# Patient Record
Sex: MALE | Race: BLACK OR AFRICAN AMERICAN | NOT HISPANIC OR LATINO
[De-identification: names, ages, dates, MRNs, and addresses within clinical notes are randomized per-mention and may not be internally consistent; named-entity substitution may affect disease eponyms.]

---

## 2017-01-17 ENCOUNTER — CHARTING TRANS (OUTPATIENT)
Dept: OTHER | Age: 22
End: 2017-01-17

## 2017-05-25 ENCOUNTER — CHARTING TRANS (OUTPATIENT)
Dept: OTHER | Age: 22
End: 2017-05-25

## 2017-05-25 ENCOUNTER — IMAGING SERVICES (OUTPATIENT)
Dept: OTHER | Age: 22
End: 2017-05-25

## 2018-04-10 ENCOUNTER — CHARTING TRANS (OUTPATIENT)
Dept: OTHER | Age: 23
End: 2018-04-10

## 2018-04-19 ENCOUNTER — CHARTING TRANS (OUTPATIENT)
Dept: OTHER | Age: 23
End: 2018-04-19

## 2018-04-19 ASSESSMENT — PAIN SCALES - GENERAL: PAINLEVEL_OUTOF10: 0

## 2018-04-21 ENCOUNTER — CHARTING TRANS (OUTPATIENT)
Dept: OTHER | Age: 23
End: 2018-04-21

## 2018-04-26 ENCOUNTER — LAB SERVICES (OUTPATIENT)
Dept: OTHER | Age: 23
End: 2018-04-26

## 2018-05-01 ENCOUNTER — CHARTING TRANS (OUTPATIENT)
Dept: OTHER | Age: 23
End: 2018-05-01

## 2018-05-01 LAB
ALBUMIN SERPL-MCNC: 4.1 G/DL (ref 3.6–5.1)
ALBUMIN/GLOB SERPL: 1.2 (ref 1–2.4)
ALP SERPL-CCNC: 74 UNITS/L (ref 45–117)
ALT SERPL-CCNC: 31 UNITS/L
ANION GAP SERPL CALC-SCNC: 15 MMOL/L (ref 10–20)
AST SERPL-CCNC: 23 UNITS/L
BASOPHILS # BLD: 0.1 K/MCL (ref 0–0.3)
BASOPHILS NFR BLD: 1 %
BILIRUB SERPL-MCNC: 0.7 MG/DL (ref 0.2–1)
BUN SERPL-MCNC: 22 MG/DL (ref 6–20)
BUN/CREAT SERPL: 22 (ref 7–25)
CALCIUM SERPL-MCNC: 9.4 MG/DL (ref 8.4–10.2)
CHLORIDE SERPL-SCNC: 106 MMOL/L (ref 98–107)
CHOLEST SERPL-MCNC: 120 MG/DL
CHOLEST/HDLC SERPL: 1.7
CO2 SERPL-SCNC: 28 MMOL/L (ref 21–32)
CREAT SERPL-MCNC: 1.02 MG/DL (ref 0.67–1.17)
DIFFERENTIAL METHOD BLD: ABNORMAL
EOSINOPHIL # BLD: 0.1 K/MCL (ref 0.1–0.5)
EOSINOPHIL NFR BLD: 2 %
ERYTHROCYTE [DISTWIDTH] IN BLOOD: 12.1 % (ref 11–15)
GLOBULIN SER-MCNC: 3.5 G/DL (ref 2–4)
GLUCOSE SERPL-MCNC: 86 MG/DL (ref 65–99)
HDLC SERPL-MCNC: 69 MG/DL
HEMATOCRIT: 43.6 % (ref 39–51)
HEMOGLOBIN: 13.7 G/DL (ref 13–17)
IMM GRANULOCYTES # BLD AUTO: 0 K/MCL (ref 0–0.2)
IMM GRANULOCYTES NFR BLD: 0 %
LDLC SERPL CALC-MCNC: 45 MG/DL
LENGTH OF FAST TIME PATIENT: ABNORMAL HRS
LENGTH OF FAST TIME PATIENT: ABNORMAL HRS
LYMPHOCYTES # BLD: 1.7 K/MCL (ref 1.2–5.2)
LYMPHOCYTES NFR BLD: 29 %
MEAN CORPUSCULAR HEMOGLOBIN: 32.6 PG (ref 26–34)
MEAN CORPUSCULAR HGB CONC: 31.4 G/DL (ref 32–36.5)
MEAN CORPUSCULAR VOLUME: 103.8 FL (ref 78–100)
MONOCYTES # BLD: 0.7 K/MCL (ref 0.3–0.9)
MONOCYTES NFR BLD: 12 %
NEUTROPHILS # BLD: 3.3 K/MCL (ref 1.8–8)
NEUTROPHILS NFR BLD: 56 %
NONHDLC SERPL-MCNC: 51 MG/DL
NRBC (NRBCRE): 0 %
PLATELET COUNT: 213 K/MCL (ref 140–450)
POTASSIUM SERPL-SCNC: 4.6 MMOL/L (ref 3.4–5.1)
RED CELL COUNT: 4.2 MIL/MCL (ref 4.5–5.9)
SODIUM SERPL-SCNC: 144 MMOL/L (ref 135–145)
TOTAL PROTEIN: 7.6 G/DL (ref 6.4–8.2)
TRIGL SERPL-MCNC: 31 MG/DL
TSH SERPL-ACNC: 0.76 MCUNITS/ML (ref 0.46–4.13)
WHITE BLOOD COUNT: 5.9 K/MCL (ref 4.2–11)

## 2018-05-09 ENCOUNTER — CHARTING TRANS (OUTPATIENT)
Dept: OTHER | Age: 23
End: 2018-05-09

## 2018-05-12 ENCOUNTER — HOSPITAL (OUTPATIENT)
Dept: OTHER | Age: 23
End: 2018-05-12

## 2018-05-17 ENCOUNTER — CHARTING TRANS (OUTPATIENT)
Dept: OTHER | Age: 23
End: 2018-05-17

## 2018-05-23 ENCOUNTER — HOSPITAL (OUTPATIENT)
Dept: OTHER | Age: 23
End: 2018-05-23

## 2018-05-23 ENCOUNTER — IMAGING SERVICES (OUTPATIENT)
Dept: OTHER | Age: 23
End: 2018-05-23

## 2018-05-24 ENCOUNTER — HOSPITAL (OUTPATIENT)
Dept: OTHER | Age: 23
End: 2018-05-24

## 2018-05-24 ENCOUNTER — CHARTING TRANS (OUTPATIENT)
Dept: OTHER | Age: 23
End: 2018-05-24

## 2018-05-30 ENCOUNTER — CHARTING TRANS (OUTPATIENT)
Dept: OTHER | Age: 23
End: 2018-05-30

## 2018-06-16 ENCOUNTER — HOSPITAL (OUTPATIENT)
Dept: OTHER | Age: 23
End: 2018-06-16

## 2018-06-19 ENCOUNTER — HOSPITAL (OUTPATIENT)
Dept: OTHER | Age: 23
End: 2018-06-19
Attending: THORACIC SURGERY (CARDIOTHORACIC VASCULAR SURGERY)

## 2018-06-19 LAB
ALBUMIN SERPL-MCNC: 3.8 GM/DL (ref 3.6–5.1)
ALBUMIN/GLOB SERPL: 1.1 {RATIO} (ref 1–2.4)
ALP SERPL-CCNC: 68 UNIT/L (ref 45–117)
ALT SERPL-CCNC: 42 UNIT/L
ANALYZER ANC (IANC): ABNORMAL
ANION GAP SERPL CALC-SCNC: 9 MMOL/L (ref 10–20)
APTT PPP: 27 SECONDS (ref 22–30)
APTT PPP: NORMAL S
AST SERPL-CCNC: 26 UNIT/L
BASOPHILS # BLD: 0.1 THOUSAND/MCL (ref 0–0.3)
BASOPHILS NFR BLD: 1 %
BILIRUB SERPL-MCNC: 0.6 MG/DL (ref 0.2–1)
BUN SERPL-MCNC: 24 MG/DL (ref 6–20)
BUN/CREAT SERPL: 22 (ref 7–25)
CALCIUM SERPL-MCNC: 9 MG/DL (ref 8.4–10.2)
CHLORIDE: 102 MMOL/L (ref 98–107)
CO2 SERPL-SCNC: 31 MMOL/L (ref 21–32)
CREAT SERPL-MCNC: 1.08 MG/DL (ref 0.67–1.17)
DIFFERENTIAL METHOD BLD: ABNORMAL
EOSINOPHIL # BLD: 0.2 THOUSAND/MCL (ref 0.1–0.5)
EOSINOPHIL NFR BLD: 5 %
ERYTHROCYTE [DISTWIDTH] IN BLOOD: 11.9 % (ref 11–15)
GLOBULIN SER-MCNC: 3.6 GM/DL (ref 2–4)
GLUCOSE SERPL-MCNC: 73 MG/DL (ref 65–99)
HEMATOCRIT: 39.9 % (ref 39–51)
HGB BLD-MCNC: 13.5 GM/DL (ref 13–17)
IMM GRANULOCYTES # BLD AUTO: 0 THOUSAND/MCL (ref 0–0.2)
IMM GRANULOCYTES NFR BLD: 0 %
INR PPP: 1
LYMPHOCYTES # BLD: 1.8 THOUSAND/MCL (ref 1.2–5.2)
LYMPHOCYTES NFR BLD: 37 %
MCH RBC QN AUTO: 32.9 PG (ref 26–34)
MCHC RBC AUTO-ENTMCNC: 33.8 GM/DL (ref 32–36.5)
MCV RBC AUTO: 97.3 FL (ref 78–100)
MONOCYTES # BLD: 0.5 THOUSAND/MCL (ref 0.3–0.9)
MONOCYTES NFR BLD: 10 %
NEUTROPHILS # BLD: 2.3 THOUSAND/MCL (ref 1.8–8)
NEUTROPHILS NFR BLD: 47 %
NEUTS SEG NFR BLD: ABNORMAL %
NRBC (NRBCRE): 0 /100 WBC
PLATELET # BLD: 227 THOUSAND/MCL (ref 140–450)
POTASSIUM SERPL-SCNC: 4.1 MMOL/L (ref 3.4–5.1)
PROT SERPL-MCNC: 7.4 GM/DL (ref 6.4–8.2)
PROTHROMBIN TIME: 10.6 SECONDS (ref 9.7–11.8)
PROTHROMBIN TIME: NORMAL
RBC # BLD: 4.1 MILLION/MCL (ref 4.5–5.9)
SODIUM SERPL-SCNC: 138 MMOL/L (ref 135–145)
WBC # BLD: 5 THOUSAND/MCL (ref 4.2–11)

## 2018-06-20 ENCOUNTER — DIAGNOSTIC TRANS (OUTPATIENT)
Dept: OTHER | Age: 23
End: 2018-06-20

## 2018-06-21 ENCOUNTER — CHARTING TRANS (OUTPATIENT)
Dept: OTHER | Age: 23
End: 2018-06-21

## 2018-06-21 ASSESSMENT — PAIN SCALES - GENERAL: PAINLEVEL_OUTOF10: 0

## 2018-06-22 ENCOUNTER — HOSPITAL (OUTPATIENT)
Dept: OTHER | Age: 23
End: 2018-06-22
Attending: THORACIC SURGERY (CARDIOTHORACIC VASCULAR SURGERY)

## 2018-06-22 LAB
ANALYZER ANC (IANC): ABNORMAL
ANION GAP SERPL CALC-SCNC: 5 MMOL/L (ref 10–20)
BASOPHILS # BLD: 0.1 THOUSAND/MCL (ref 0–0.3)
BASOPHILS NFR BLD: 1 %
BUN SERPL-MCNC: 19 MG/DL (ref 6–20)
BUN/CREAT SERPL: 19 (ref 7–25)
CALCIUM SERPL-MCNC: 8.6 MG/DL (ref 8.4–10.2)
CHLORIDE: 103 MMOL/L (ref 98–107)
CO2 SERPL-SCNC: 29 MMOL/L (ref 21–32)
CREAT SERPL-MCNC: 0.99 MG/DL (ref 0.67–1.17)
DIFFERENTIAL METHOD BLD: ABNORMAL
EOSINOPHIL # BLD: 0.2 THOUSAND/MCL (ref 0.1–0.5)
EOSINOPHIL NFR BLD: 2 %
ERYTHROCYTE [DISTWIDTH] IN BLOOD: 11.9 % (ref 11–15)
GLUCOSE BLDC GLUCOMTR-MCNC: 105 MG/DL (ref 65–99)
GLUCOSE SERPL-MCNC: 138 MG/DL (ref 65–99)
HEMATOCRIT: 37.4 % (ref 39–51)
HGB BLD-MCNC: 12.7 GM/DL (ref 13–17)
IMM GRANULOCYTES # BLD AUTO: 0 THOUSAND/MCL (ref 0–0.2)
IMM GRANULOCYTES NFR BLD: 0 %
LYMPHOCYTES # BLD: 2.7 THOUSAND/MCL (ref 1.2–5.2)
LYMPHOCYTES NFR BLD: 32 %
MAGNESIUM SERPL-MCNC: 1.6 MG/DL (ref 1.7–2.4)
MCH RBC QN AUTO: 32.6 PG (ref 26–34)
MCHC RBC AUTO-ENTMCNC: 34 GM/DL (ref 32–36.5)
MCV RBC AUTO: 96.1 FL (ref 78–100)
MONOCYTES # BLD: 0.6 THOUSAND/MCL (ref 0.3–0.9)
MONOCYTES NFR BLD: 7 %
NEUTROPHILS # BLD: 4.8 THOUSAND/MCL (ref 1.8–8)
NEUTROPHILS NFR BLD: 58 %
NEUTS SEG NFR BLD: ABNORMAL %
NRBC (NRBCRE): 0 /100 WBC
PHOSPHATE SERPL-MCNC: 3.3 MG/DL (ref 2.4–4.7)
PLATELET # BLD: 238 THOUSAND/MCL (ref 140–450)
POTASSIUM SERPL-SCNC: 3.3 MMOL/L (ref 3.4–5.1)
RBC # BLD: 3.89 MILLION/MCL (ref 4.5–5.9)
SODIUM SERPL-SCNC: 134 MMOL/L (ref 135–145)
WBC # BLD: 8.4 THOUSAND/MCL (ref 4.2–11)

## 2018-06-23 ENCOUNTER — CHARTING TRANS (OUTPATIENT)
Dept: OTHER | Age: 23
End: 2018-06-23

## 2018-06-23 LAB
ANALYZER ANC (IANC): ABNORMAL
ANION GAP SERPL CALC-SCNC: 8 MMOL/L (ref 10–20)
BASOPHILS # BLD: 0 THOUSAND/MCL (ref 0–0.3)
BASOPHILS NFR BLD: 0 %
BUN SERPL-MCNC: 14 MG/DL (ref 6–20)
BUN/CREAT SERPL: 13 (ref 7–25)
CALCIUM SERPL-MCNC: 8.9 MG/DL (ref 8.4–10.2)
CHLORIDE: 100 MMOL/L (ref 98–107)
CO2 SERPL-SCNC: 28 MMOL/L (ref 21–32)
CREAT SERPL-MCNC: 1.06 MG/DL (ref 0.67–1.17)
DIFFERENTIAL METHOD BLD: ABNORMAL
EOSINOPHIL # BLD: 0 THOUSAND/MCL (ref 0.1–0.5)
EOSINOPHIL NFR BLD: 0 %
ERYTHROCYTE [DISTWIDTH] IN BLOOD: 11.9 % (ref 11–15)
GLUCOSE SERPL-MCNC: 116 MG/DL (ref 65–99)
HEMATOCRIT: 37.5 % (ref 39–51)
HGB BLD-MCNC: 12.8 GM/DL (ref 13–17)
IMM GRANULOCYTES # BLD AUTO: 0 THOUSAND/MCL (ref 0–0.2)
IMM GRANULOCYTES NFR BLD: 0 %
LYMPHOCYTES # BLD: 1.5 THOUSAND/MCL (ref 1.2–5.2)
LYMPHOCYTES NFR BLD: 16 %
MAGNESIUM SERPL-MCNC: 2.3 MG/DL (ref 1.7–2.4)
MCH RBC QN AUTO: 32.8 PG (ref 26–34)
MCHC RBC AUTO-ENTMCNC: 34.1 GM/DL (ref 32–36.5)
MCV RBC AUTO: 96.2 FL (ref 78–100)
MONOCYTES # BLD: 0.9 THOUSAND/MCL (ref 0.3–0.9)
MONOCYTES NFR BLD: 9 %
NEUTROPHILS # BLD: 7 THOUSAND/MCL (ref 1.8–8)
NEUTROPHILS NFR BLD: 75 %
NEUTS SEG NFR BLD: ABNORMAL %
NRBC (NRBCRE): 0 /100 WBC
PHOSPHATE SERPL-MCNC: 4.7 MG/DL (ref 2.4–4.7)
PLATELET # BLD: 231 THOUSAND/MCL (ref 140–450)
POTASSIUM SERPL-SCNC: 4.5 MMOL/L (ref 3.4–5.1)
RBC # BLD: 3.9 MILLION/MCL (ref 4.5–5.9)
SODIUM SERPL-SCNC: 131 MMOL/L (ref 135–145)
WBC # BLD: 9.4 THOUSAND/MCL (ref 4.2–11)

## 2018-06-24 LAB
ANALYZER ANC (IANC): ABNORMAL
ANION GAP SERPL CALC-SCNC: 8 MMOL/L (ref 10–20)
BASOPHILS # BLD: 0.1 THOUSAND/MCL (ref 0–0.3)
BASOPHILS NFR BLD: 1 %
BUN SERPL-MCNC: 20 MG/DL (ref 6–20)
BUN/CREAT SERPL: 17 (ref 7–25)
CALCIUM SERPL-MCNC: 8.8 MG/DL (ref 8.4–10.2)
CHLORIDE: 102 MMOL/L (ref 98–107)
CO2 SERPL-SCNC: 29 MMOL/L (ref 21–32)
CREAT SERPL-MCNC: 1.18 MG/DL (ref 0.67–1.17)
DIFFERENTIAL METHOD BLD: ABNORMAL
EOSINOPHIL # BLD: 0.1 THOUSAND/MCL (ref 0.1–0.5)
EOSINOPHIL NFR BLD: 2 %
ERYTHROCYTE [DISTWIDTH] IN BLOOD: 11.9 % (ref 11–15)
GLUCOSE SERPL-MCNC: 94 MG/DL (ref 65–99)
HEMATOCRIT: 37.9 % (ref 39–51)
HGB BLD-MCNC: 12.4 GM/DL (ref 13–17)
IMM GRANULOCYTES # BLD AUTO: 0 THOUSAND/MCL (ref 0–0.2)
IMM GRANULOCYTES NFR BLD: 0 %
LYMPHOCYTES # BLD: 2.4 THOUSAND/MCL (ref 1.2–5.2)
LYMPHOCYTES NFR BLD: 38 %
MAGNESIUM SERPL-MCNC: 1.9 MG/DL (ref 1.7–2.4)
MCH RBC QN AUTO: 32.2 PG (ref 26–34)
MCHC RBC AUTO-ENTMCNC: 32.7 GM/DL (ref 32–36.5)
MCV RBC AUTO: 98.4 FL (ref 78–100)
MONOCYTES # BLD: 0.8 THOUSAND/MCL (ref 0.3–0.9)
MONOCYTES NFR BLD: 12 %
NEUTROPHILS # BLD: 3 THOUSAND/MCL (ref 1.8–8)
NEUTROPHILS NFR BLD: 47 %
NEUTS SEG NFR BLD: ABNORMAL %
NRBC (NRBCRE): 0 /100 WBC
PHOSPHATE SERPL-MCNC: 3.4 MG/DL (ref 2.4–4.7)
PLATELET # BLD: 184 THOUSAND/MCL (ref 140–450)
POTASSIUM SERPL-SCNC: 4.2 MMOL/L (ref 3.4–5.1)
RBC # BLD: 3.85 MILLION/MCL (ref 4.5–5.9)
SODIUM SERPL-SCNC: 135 MMOL/L (ref 135–145)
WBC # BLD: 6.4 THOUSAND/MCL (ref 4.2–11)

## 2018-07-08 ENCOUNTER — IMAGING SERVICES (OUTPATIENT)
Dept: OTHER | Age: 23
End: 2018-07-08

## 2018-07-08 ENCOUNTER — LAB SERVICES (OUTPATIENT)
Dept: OTHER | Age: 23
End: 2018-07-08

## 2018-07-08 ENCOUNTER — HOSPITAL (OUTPATIENT)
Dept: OTHER | Age: 23
End: 2018-07-08
Attending: INTERNAL MEDICINE

## 2018-07-08 LAB
ALBUMIN SERPL-MCNC: 3.6 G/DL (ref 3.6–5.1)
ALBUMIN SERPL-MCNC: 3.6 GM/DL (ref 3.6–5.1)
ALBUMIN/GLOB SERPL: 0.8 (ref 1–2.4)
ALBUMIN/GLOB SERPL: 0.8 {RATIO} (ref 1–2.4)
ALP SERPL-CCNC: 73 UNIT/L (ref 45–117)
ALP SERPL-CCNC: 73 UNITS/L (ref 45–117)
ALT SERPL-CCNC: 29 UNIT/L
ALT SERPL-CCNC: 29 UNITS/L
ANALYZER ANC (IANC): ABNORMAL
ANALYZER ANC (IANC): ABNORMAL
ANION GAP SERPL CALC-SCNC: 14 MMOL/L (ref 10–20)
ANION GAP SERPL CALC-SCNC: 14 MMOL/L (ref 10–20)
APTT PPP: 24 SEC (ref 22–30)
APTT PPP: 24 SECONDS (ref 22–30)
APTT PPP: NORMAL
APTT PPP: NORMAL S
AST SERPL-CCNC: 43 UNIT/L
AST SERPL-CCNC: 43 UNITS/L
BASOPHILS # BLD: 0.1 K/MCL (ref 0–0.3)
BASOPHILS # BLD: 0.1 THOUSAND/MCL (ref 0–0.3)
BASOPHILS NFR BLD: 1 %
BASOPHILS NFR BLD: 1 %
BILIRUB SERPL-MCNC: 0.8 MG/DL (ref 0.2–1)
BILIRUB SERPL-MCNC: 0.8 MG/DL (ref 0.2–1)
BUN SERPL-MCNC: 23 MG/DL (ref 6–20)
BUN SERPL-MCNC: 23 MG/DL (ref 6–20)
BUN/CREAT SERPL: 27 (ref 7–25)
BUN/CREAT SERPL: 27 (ref 7–25)
CALCIUM SERPL-MCNC: 9.3 MG/DL (ref 8.4–10.2)
CALCIUM SERPL-MCNC: 9.3 MG/DL (ref 8.4–10.2)
CHLORIDE SERPL-SCNC: 102 MMOL/L (ref 98–107)
CHLORIDE: 102 MMOL/L (ref 98–107)
CO2 SERPL-SCNC: 23 MMOL/L (ref 21–32)
CO2 SERPL-SCNC: 23 MMOL/L (ref 21–32)
CREAT SERPL-MCNC: 0.86 MG/DL (ref 0.67–1.17)
CREAT SERPL-MCNC: 0.86 MG/DL (ref 0.67–1.17)
DIFFERENTIAL METHOD BLD: ABNORMAL
DIFFERENTIAL METHOD BLD: ABNORMAL
EOSINOPHIL # BLD: 0.3 K/MCL (ref 0.1–0.5)
EOSINOPHIL # BLD: 0.3 THOUSAND/MCL (ref 0.1–0.5)
EOSINOPHIL NFR BLD: 4 %
EOSINOPHIL NFR BLD: 4 %
ERYTHROCYTE [DISTWIDTH] IN BLOOD: 11.9 % (ref 11–15)
ERYTHROCYTE [DISTWIDTH] IN BLOOD: 11.9 % (ref 11–15)
GLOBULIN SER-MCNC: 4.5 G/DL (ref 2–4)
GLOBULIN SER-MCNC: 4.5 GM/DL (ref 2–4)
GLUCOSE SERPL-MCNC: 80 MG/DL (ref 65–99)
GLUCOSE SERPL-MCNC: 80 MG/DL (ref 65–99)
HEMATOCRIT: 36.9 % (ref 39–51)
HEMATOCRIT: 36.9 % (ref 39–51)
HEMOGLOBIN: 12.4 G/DL (ref 13–17)
HGB BLD-MCNC: 12.4 GM/DL (ref 13–17)
IMM GRANULOCYTES # BLD AUTO: 0 K/MCL (ref 0–0.2)
IMM GRANULOCYTES # BLD AUTO: 0 THOUSAND/MCL (ref 0–0.2)
IMM GRANULOCYTES NFR BLD: 0 %
IMM GRANULOCYTES NFR BLD: 0 %
INR PPP: 1
INR PPP: 1
LYMPHOCYTES # BLD: 1.6 K/MCL (ref 1.2–5.2)
LYMPHOCYTES # BLD: 1.6 THOUSAND/MCL (ref 1.2–5.2)
LYMPHOCYTES NFR BLD: 23 %
LYMPHOCYTES NFR BLD: 23 %
MCH RBC QN AUTO: 32.4 PG (ref 26–34)
MCHC RBC AUTO-ENTMCNC: 33.6 GM/DL (ref 32–36.5)
MCV RBC AUTO: 96.3 FL (ref 78–100)
MEAN CORPUSCULAR HEMOGLOBIN: 32.4 PG (ref 26–34)
MEAN CORPUSCULAR HGB CONC: 33.6 G/DL (ref 32–36.5)
MEAN CORPUSCULAR VOLUME: 96.3 FL (ref 78–100)
MONOCYTES # BLD: 0.7 K/MCL (ref 0.3–0.9)
MONOCYTES # BLD: 0.7 THOUSAND/MCL (ref 0.3–0.9)
MONOCYTES NFR BLD: 11 %
MONOCYTES NFR BLD: 11 %
NEUTROPHILS # BLD: 4.2 K/MCL (ref 1.8–8)
NEUTROPHILS # BLD: 4.2 THOUSAND/MCL (ref 1.8–8)
NEUTROPHILS NFR BLD: 61 %
NEUTROPHILS NFR BLD: 61 %
NEUTS SEG NFR BLD: ABNORMAL
NEUTS SEG NFR BLD: ABNORMAL %
NRBC (NRBCRE): 0 /100 WBC
NRBC (NRBCRE): 0 /100 WBC
PLATELET # BLD: 215 THOUSAND/MCL (ref 140–450)
PLATELET COUNT: 215 K/MCL (ref 140–450)
POTASSIUM SERPL-SCNC: 4.9 MMOL/L (ref 3.4–5.1)
POTASSIUM SERPL-SCNC: 4.9 MMOL/L (ref 3.4–5.1)
PROT SERPL-MCNC: 8.1 GM/DL (ref 6.4–8.2)
PROTHROMBIN TIME (PRT2): NORMAL
PROTHROMBIN TIME: 10.2 SEC (ref 9.7–11.8)
PROTHROMBIN TIME: 10.2 SECONDS (ref 9.7–11.8)
PROTHROMBIN TIME: NORMAL
RBC # BLD: 3.83 MILLION/MCL (ref 4.5–5.9)
RED CELL COUNT: 3.83 MIL/MCL (ref 4.5–5.9)
SODIUM SERPL-SCNC: 134 MMOL/L (ref 135–145)
SODIUM SERPL-SCNC: 134 MMOL/L (ref 135–145)
TOTAL PROTEIN: 8.1 G/DL (ref 6.4–8.2)
WBC # BLD: 6.9 THOUSAND/MCL (ref 4.2–11)
WHITE BLOOD COUNT: 6.9 K/MCL (ref 4.2–11)

## 2018-07-10 LAB
ANALYZER ANC (IANC): ABNORMAL
ANION GAP SERPL CALC-SCNC: 10 MMOL/L (ref 10–20)
APPEARANCE UR: CLEAR
BACTERIA #/AREA URNS HPF: ABNORMAL /HPF
BILIRUB UR QL: NEGATIVE
BUN SERPL-MCNC: 12 MG/DL (ref 6–20)
BUN/CREAT SERPL: 14 (ref 7–25)
CALCIUM SERPL-MCNC: 9.4 MG/DL (ref 8.4–10.2)
CHLORIDE: 99 MMOL/L (ref 98–107)
CO2 SERPL-SCNC: 28 MMOL/L (ref 21–32)
COLOR UR: YELLOW
CREAT SERPL-MCNC: 0.86 MG/DL (ref 0.67–1.17)
ERYTHROCYTE [DISTWIDTH] IN BLOOD: 11.3 % (ref 11–15)
GLUCOSE SERPL-MCNC: 105 MG/DL (ref 65–99)
GLUCOSE UR-MCNC: NEGATIVE MG/DL
HEMATOCRIT: 39.2 % (ref 39–51)
HGB BLD-MCNC: 13.8 GM/DL (ref 13–17)
HGB UR QL: NEGATIVE
HYALINE CASTS #/AREA URNS LPF: ABNORMAL /LPF (ref 0–5)
KETONES UR-MCNC: 15 MG/DL
LEUKOCYTE ESTERASE UR QL STRIP: ABNORMAL
MCH RBC QN AUTO: 32.3 PG (ref 26–34)
MCHC RBC AUTO-ENTMCNC: 35.2 GM/DL (ref 32–36.5)
MCV RBC AUTO: 91.8 FL (ref 78–100)
NITRITE UR QL: NEGATIVE
NRBC (NRBCRE): 0 /100 WBC
PH UR: 6 UNIT (ref 5–7)
PLATELET # BLD: 271 THOUSAND/MCL (ref 140–450)
POTASSIUM SERPL-SCNC: 3.5 MMOL/L (ref 3.4–5.1)
PROT UR QL: NEGATIVE MG/DL
RBC # BLD: 4.27 MILLION/MCL (ref 4.5–5.9)
RBC #/AREA URNS HPF: ABNORMAL /HPF (ref 0–3)
SODIUM SERPL-SCNC: 133 MMOL/L (ref 135–145)
SP GR UR: 1.02 (ref 1–1.03)
SPECIMEN SOURCE: ABNORMAL
SQUAMOUS #/AREA URNS HPF: ABNORMAL /HPF (ref 0–5)
URNS CMNT MICRO: ABNORMAL
UROBILINOGEN UR QL: 1 MG/DL (ref 0–1)
WBC # BLD: 8.9 THOUSAND/MCL (ref 4.2–11)
WBC #/AREA URNS HPF: ABNORMAL /HPF (ref 0–5)

## 2018-07-17 ENCOUNTER — CHARTING TRANS (OUTPATIENT)
Dept: OTHER | Age: 23
End: 2018-07-17

## 2018-07-17 ASSESSMENT — PAIN SCALES - GENERAL: PAINLEVEL_OUTOF10: 0

## 2018-08-08 ENCOUNTER — CHARTING TRANS (OUTPATIENT)
Dept: OTHER | Age: 23
End: 2018-08-08

## 2018-08-24 ENCOUNTER — CHARTING TRANS (OUTPATIENT)
Dept: OTHER | Age: 23
End: 2018-08-24

## 2018-09-19 ENCOUNTER — CHARTING TRANS (OUTPATIENT)
Dept: OTHER | Age: 23
End: 2018-09-19

## 2018-09-19 ASSESSMENT — PAIN SCALES - GENERAL: PAINLEVEL_OUTOF10: 0

## 2018-10-24 ENCOUNTER — CHARTING TRANS (OUTPATIENT)
Dept: OTHER | Age: 23
End: 2018-10-24

## 2018-10-24 ASSESSMENT — PAIN SCALES - GENERAL: PAINLEVEL_OUTOF10: 0

## 2018-10-31 VITALS
DIASTOLIC BLOOD PRESSURE: 68 MMHG | RESPIRATION RATE: 18 BRPM | OXYGEN SATURATION: 99 % | HEART RATE: 70 BPM | TEMPERATURE: 98 F | WEIGHT: 187.39 LBS | HEIGHT: 68 IN | BODY MASS INDEX: 28.4 KG/M2 | SYSTOLIC BLOOD PRESSURE: 110 MMHG

## 2018-11-01 VITALS
TEMPERATURE: 98.8 F | WEIGHT: 179.67 LBS | OXYGEN SATURATION: 100 % | HEART RATE: 85 BPM | SYSTOLIC BLOOD PRESSURE: 120 MMHG | BODY MASS INDEX: 27.23 KG/M2 | HEIGHT: 68 IN | DIASTOLIC BLOOD PRESSURE: 72 MMHG | RESPIRATION RATE: 16 BRPM

## 2018-11-01 VITALS
BODY MASS INDEX: 26.67 KG/M2 | DIASTOLIC BLOOD PRESSURE: 70 MMHG | HEART RATE: 83 BPM | OXYGEN SATURATION: 99 % | WEIGHT: 176 LBS | SYSTOLIC BLOOD PRESSURE: 120 MMHG | HEIGHT: 68 IN

## 2018-11-01 VITALS
SYSTOLIC BLOOD PRESSURE: 100 MMHG | BODY MASS INDEX: 28.17 KG/M2 | RESPIRATION RATE: 16 BRPM | TEMPERATURE: 97 F | DIASTOLIC BLOOD PRESSURE: 60 MMHG | HEART RATE: 65 BPM | HEIGHT: 68 IN | OXYGEN SATURATION: 98 % | WEIGHT: 185.85 LBS

## 2018-11-03 VITALS
OXYGEN SATURATION: 98 % | HEART RATE: 70 BPM | DIASTOLIC BLOOD PRESSURE: 87 MMHG | SYSTOLIC BLOOD PRESSURE: 130 MMHG | BODY MASS INDEX: 28.63 KG/M2 | RESPIRATION RATE: 20 BRPM | TEMPERATURE: 98.6 F | WEIGHT: 188.93 LBS | HEIGHT: 68 IN

## 2018-11-06 VITALS
DIASTOLIC BLOOD PRESSURE: 76 MMHG | HEART RATE: 69 BPM | HEIGHT: 68 IN | WEIGHT: 192.24 LBS | RESPIRATION RATE: 18 BRPM | SYSTOLIC BLOOD PRESSURE: 133 MMHG | BODY MASS INDEX: 29.14 KG/M2 | TEMPERATURE: 97.8 F | OXYGEN SATURATION: 100 %

## 2018-11-27 VITALS
HEIGHT: 68 IN | TEMPERATURE: 98 F | DIASTOLIC BLOOD PRESSURE: 78 MMHG | OXYGEN SATURATION: 100 % | BODY MASS INDEX: 28.73 KG/M2 | HEART RATE: 80 BPM | SYSTOLIC BLOOD PRESSURE: 126 MMHG | WEIGHT: 189.6 LBS | RESPIRATION RATE: 18 BRPM

## 2018-11-27 VITALS
TEMPERATURE: 97.9 F | DIASTOLIC BLOOD PRESSURE: 68 MMHG | RESPIRATION RATE: 16 BRPM | WEIGHT: 171.52 LBS | HEIGHT: 68 IN | SYSTOLIC BLOOD PRESSURE: 116 MMHG | OXYGEN SATURATION: 96 % | HEART RATE: 63 BPM | BODY MASS INDEX: 25.99 KG/M2

## 2018-11-27 VITALS — DIASTOLIC BLOOD PRESSURE: 70 MMHG | SYSTOLIC BLOOD PRESSURE: 120 MMHG

## 2018-12-08 ENCOUNTER — TELEPHONE (OUTPATIENT)
Dept: SCHEDULING | Age: 23
End: 2018-12-08

## 2018-12-27 ENCOUNTER — TELEPHONE (OUTPATIENT)
Dept: SCHEDULING | Age: 23
End: 2018-12-27

## 2019-01-02 ENCOUNTER — OFFICE VISIT (OUTPATIENT)
Dept: INTERNAL MEDICINE | Age: 24
End: 2019-01-02

## 2019-01-02 ENCOUNTER — APPOINTMENT (OUTPATIENT)
Dept: INTERNAL MEDICINE | Age: 24
End: 2019-01-02

## 2019-01-02 DIAGNOSIS — D64.9 ANEMIA, UNSPECIFIED TYPE: ICD-10-CM

## 2019-01-02 DIAGNOSIS — Z79.01 CURRENT USE OF LONG TERM ANTICOAGULATION: Primary | ICD-10-CM

## 2019-01-02 DIAGNOSIS — I82.621 ACUTE DEEP VEIN THROMBOSIS (DVT) OF OTHER VEIN OF RIGHT UPPER EXTREMITY (CMD): ICD-10-CM

## 2019-01-02 DIAGNOSIS — J98.51 FIBROSING MEDIASTINITIS: ICD-10-CM

## 2019-01-02 DIAGNOSIS — I87.1 SUPERIOR VENA CAVA COMPRESSION SYNDROME: ICD-10-CM

## 2019-01-02 PROCEDURE — 99214 OFFICE O/P EST MOD 30 MIN: CPT | Performed by: INTERNAL MEDICINE

## 2019-01-02 SDOH — HEALTH STABILITY: MENTAL HEALTH: HOW OFTEN DO YOU HAVE A DRINK CONTAINING ALCOHOL?: NEVER

## 2019-01-02 ASSESSMENT — PAIN SCALES - GENERAL: PAINLEVEL: 0

## 2019-01-06 PROBLEM — D64.9 ANEMIA: Status: ACTIVE | Noted: 2018-07-22

## 2019-01-06 ASSESSMENT — ENCOUNTER SYMPTOMS
NAUSEA: 0
APPETITE CHANGE: 0
POLYPHAGIA: 0
BLOOD IN STOOL: 0
UNEXPECTED WEIGHT CHANGE: 0
DIZZINESS: 0
ABDOMINAL PAIN: 0
BRUISES/BLEEDS EASILY: 0
TROUBLE SWALLOWING: 0
SHORTNESS OF BREATH: 0
ADENOPATHY: 0
CHILLS: 0
FACIAL ASYMMETRY: 0
SEIZURES: 0
TREMORS: 0
DIAPHORESIS: 0
FEVER: 0
VOMITING: 0
POLYDIPSIA: 0
WHEEZING: 0
FATIGUE: 0

## 2019-01-24 ENCOUNTER — TELEPHONE (OUTPATIENT)
Dept: SCHEDULING | Age: 24
End: 2019-01-24

## 2019-01-30 ENCOUNTER — OFFICE VISIT (OUTPATIENT)
Dept: INTERNAL MEDICINE | Age: 24
End: 2019-01-30

## 2019-01-30 ENCOUNTER — TELEPHONE (OUTPATIENT)
Dept: SCHEDULING | Age: 24
End: 2019-01-30

## 2019-01-30 VITALS
WEIGHT: 163.58 LBS | OXYGEN SATURATION: 97 % | SYSTOLIC BLOOD PRESSURE: 110 MMHG | BODY MASS INDEX: 24.79 KG/M2 | DIASTOLIC BLOOD PRESSURE: 80 MMHG | HEART RATE: 64 BPM | TEMPERATURE: 97 F | HEIGHT: 68 IN

## 2019-01-30 DIAGNOSIS — J98.51 FIBROSING MEDIASTINITIS: ICD-10-CM

## 2019-01-30 DIAGNOSIS — I82.621 ACUTE DEEP VEIN THROMBOSIS (DVT) OF RIGHT UPPER EXTREMITY, UNSPECIFIED VEIN (CMD): ICD-10-CM

## 2019-01-30 DIAGNOSIS — Z11.3 SCREENING FOR STDS (SEXUALLY TRANSMITTED DISEASES): ICD-10-CM

## 2019-01-30 DIAGNOSIS — I87.1 SUPERIOR VENA CAVA COMPRESSION SYNDROME: ICD-10-CM

## 2019-01-30 DIAGNOSIS — Z20.2 CHLAMYDIA CONTACT: Primary | ICD-10-CM

## 2019-01-30 DIAGNOSIS — Z79.01 CURRENT USE OF LONG TERM ANTICOAGULATION: ICD-10-CM

## 2019-01-30 PROCEDURE — 99214 OFFICE O/P EST MOD 30 MIN: CPT | Performed by: INTERNAL MEDICINE

## 2019-01-30 RX ORDER — DOXYCYCLINE 100 MG/1
100 CAPSULE ORAL 2 TIMES DAILY
Qty: 14 CAPSULE | Refills: 0 | Status: SHIPPED | OUTPATIENT
Start: 2019-01-30 | End: 2019-02-02 | Stop reason: SDUPTHER

## 2019-01-30 SDOH — HEALTH STABILITY: MENTAL HEALTH: HOW OFTEN DO YOU HAVE A DRINK CONTAINING ALCOHOL?: NEVER

## 2019-02-02 RX ORDER — DOXYCYCLINE 100 MG/1
100 CAPSULE ORAL 2 TIMES DAILY
Qty: 14 CAPSULE | Refills: 0 | Status: SHIPPED | OUTPATIENT
Start: 2019-02-02 | End: 2019-02-09

## 2019-02-03 ASSESSMENT — ENCOUNTER SYMPTOMS
APPETITE CHANGE: 0
FATIGUE: 0
ADENOPATHY: 0
DIAPHORESIS: 0
SHORTNESS OF BREATH: 0
UNEXPECTED WEIGHT CHANGE: 0
WHEEZING: 0
SEIZURES: 0
POLYDIPSIA: 0
FACIAL ASYMMETRY: 0
POLYPHAGIA: 0
CHILLS: 0
TROUBLE SWALLOWING: 0
BLOOD IN STOOL: 0
NAUSEA: 0
FEVER: 0
BRUISES/BLEEDS EASILY: 0
TREMORS: 0
ABDOMINAL PAIN: 0
DIZZINESS: 0
VOMITING: 0

## 2019-02-09 ENCOUNTER — LAB SERVICES (OUTPATIENT)
Dept: LAB | Age: 24
End: 2019-02-09

## 2019-02-09 DIAGNOSIS — Z11.3 SCREENING FOR STDS (SEXUALLY TRANSMITTED DISEASES): ICD-10-CM

## 2019-02-09 DIAGNOSIS — Z20.2 CHLAMYDIA CONTACT: ICD-10-CM

## 2019-02-09 LAB
ALBUMIN SERPL-MCNC: 4.2 G/DL (ref 3.6–5.1)
ALBUMIN/GLOB SERPL: 1.3 {RATIO} (ref 1–2.4)
ALP SERPL-CCNC: 85 UNITS/L (ref 45–117)
ALT SERPL-CCNC: 28 UNITS/L
ANION GAP SERPL CALC-SCNC: 12 MMOL/L (ref 10–20)
AST SERPL-CCNC: 25 UNITS/L
BASOPHILS # BLD AUTO: 0.1 K/MCL (ref 0–0.3)
BASOPHILS NFR BLD AUTO: 2 %
BILIRUB SERPL-MCNC: 0.6 MG/DL (ref 0.2–1)
BUN SERPL-MCNC: 30 MG/DL (ref 6–20)
BUN/CREAT SERPL: 27 (ref 7–25)
CALCIUM SERPL-MCNC: 9.2 MG/DL (ref 8.4–10.2)
CHLORIDE SERPL-SCNC: 107 MMOL/L (ref 98–107)
CHOLEST SERPL-MCNC: 149 MG/DL
CHOLEST/HDLC SERPL: 2 {RATIO}
CO2 SERPL-SCNC: 29 MMOL/L (ref 21–32)
CREAT SERPL-MCNC: 1.13 MG/DL (ref 0.67–1.17)
DIFFERENTIAL METHOD BLD: ABNORMAL
EOSINOPHIL # BLD AUTO: 0.2 K/MCL (ref 0.1–0.5)
EOSINOPHIL NFR SPEC: 6 %
ERYTHROCYTE [DISTWIDTH] IN BLOOD: 11.7 % (ref 11–15)
FASTING STATUS PATIENT QL REPORTED: ABNORMAL HRS
GLOBULIN SER-MCNC: 3.2 G/DL (ref 2–4)
GLUCOSE SERPL-MCNC: 107 MG/DL (ref 65–99)
HCT VFR BLD CALC: 41.1 % (ref 39–51)
HDLC SERPL-MCNC: 74 MG/DL
HGB BLD-MCNC: 13.7 G/DL (ref 13–17)
IMM GRANULOCYTES # BLD AUTO: 0 K/MCL (ref 0–0.2)
IMM GRANULOCYTES NFR BLD: 0 %
IRON SATN MFR SERPL: 40 % (ref 15–45)
IRON SERPL-MCNC: 96 MCG/DL (ref 65–175)
LDLC SERPL-MCNC: 65 MG/DL
LENGTH OF FAST TIME PATIENT: NORMAL HRS
LYMPHOCYTES # BLD MANUAL: 1.5 K/MCL (ref 1–4.8)
LYMPHOCYTES NFR BLD MANUAL: 42 %
MCH RBC QN AUTO: 33.5 PG (ref 26–34)
MCHC RBC AUTO-ENTMCNC: 33.3 G/DL (ref 32–36.5)
MCV RBC AUTO: 100.5 FL (ref 78–100)
MONOCYTES # BLD MANUAL: 0.3 K/MCL (ref 0.3–0.9)
MONOCYTES NFR BLD MANUAL: 9 %
NEUTROPHILS # BLD: 1.5 K/MCL (ref 1.8–7.7)
NEUTROPHILS NFR BLD AUTO: 41 %
NONHDLC SERPL-MCNC: 75 MG/DL
NRBC BLD MANUAL-RTO: 0 /100 WBC
PLATELET # BLD: 234 K/MCL (ref 140–450)
POTASSIUM SERPL-SCNC: 4 MMOL/L (ref 3.4–5.1)
PROT SERPL-MCNC: 7.4 G/DL (ref 6.4–8.2)
RBC # BLD: 4.09 MIL/MCL (ref 4.5–5.9)
SODIUM SERPL-SCNC: 144 MMOL/L (ref 135–145)
TIBC SERPL-MCNC: 240 MCG/DL (ref 250–450)
TRIGL SERPL-MCNC: 52 MG/DL
TSH SERPL-ACNC: 0.5 MCUNITS/ML (ref 0.35–5)
WBC # BLD: 3.5 K/MCL (ref 4.2–11)

## 2019-02-10 LAB
AT III ACT/NOR PPP CHRO: 87 % (ref 80–124)
AT III AG PPP IA-MCNC: 81 % (ref 78–131)
FOLATE SERPL-MCNC: 8.9 NG/ML
HIV 1+2 AB+HIV1 P24 AG SERPL QL IA: NONREACTIVE
RPR SER QL: NONREACTIVE
VIT B12 SERPL-MCNC: 716 PG/ML (ref 211–911)

## 2019-02-11 LAB
HIV1 RNA # SERPL NAA+PROBE: NOT DETECTED COPIES/ML
HIV1 RNA SERPL NAA+PROBE-LOG#: NOT DETECTED COPIES/ML

## 2019-02-12 LAB
C TRACH RRNA SPEC QL NAA+PROBE: NEGATIVE
N GONORRHOEA RRNA SPEC QL NAA+PROBE: NEGATIVE
SERVICE CMNT-IMP: NORMAL
SPECIMEN SOURCE: NORMAL
SPECIMEN SOURCE: NORMAL
T VAGINALIS RRNA SPEC QL NAA+PROBE: NEGATIVE

## 2019-02-13 LAB
APTT HEX PL PPP: 2 SEC
APTT INHIB SENS PPP: 29.4 SEC (ref 22–32)
CONFIRM DRVVT: 1.1 S
LA 3 SCREEN W REFLEX-IMP: ABNORMAL
MIXING APTT: 28.5 SEC (ref 22–32)
MIXING DRVVT: 40.7 SEC
PATHOLOGIST NAME: ABNORMAL
SCREEN DRVVT: 47.2 SEC
THROMBIN TIME: 18.7 SEC (ref 15.3–21.1)

## 2019-03-07 ENCOUNTER — TELEPHONE (OUTPATIENT)
Dept: SCHEDULING | Age: 24
End: 2019-03-07

## 2019-06-13 ENCOUNTER — TELEPHONE (OUTPATIENT)
Dept: SCHEDULING | Age: 24
End: 2019-06-13

## 2019-08-02 ENCOUNTER — TELEPHONE (OUTPATIENT)
Dept: SCHEDULING | Age: 24
End: 2019-08-02

## 2019-08-07 ENCOUNTER — TELEPHONE (OUTPATIENT)
Dept: SCHEDULING | Age: 24
End: 2019-08-07

## 2019-08-07 ENCOUNTER — OFFICE VISIT (OUTPATIENT)
Dept: INTERNAL MEDICINE | Age: 24
End: 2019-08-07

## 2019-08-07 VITALS
SYSTOLIC BLOOD PRESSURE: 108 MMHG | RESPIRATION RATE: 16 BRPM | HEIGHT: 68 IN | WEIGHT: 171.08 LBS | BODY MASS INDEX: 25.93 KG/M2 | DIASTOLIC BLOOD PRESSURE: 60 MMHG | HEART RATE: 56 BPM | TEMPERATURE: 97.6 F | OXYGEN SATURATION: 100 %

## 2019-08-07 DIAGNOSIS — Z79.01 CURRENT USE OF LONG TERM ANTICOAGULATION: ICD-10-CM

## 2019-08-07 DIAGNOSIS — I82.621 ACUTE DEEP VEIN THROMBOSIS (DVT) OF RIGHT UPPER EXTREMITY, UNSPECIFIED VEIN (CMD): Primary | ICD-10-CM

## 2019-08-07 DIAGNOSIS — J98.51 FIBROSING MEDIASTINITIS: ICD-10-CM

## 2019-08-07 DIAGNOSIS — I87.1 SUPERIOR VENA CAVA COMPRESSION SYNDROME: ICD-10-CM

## 2019-08-07 PROCEDURE — 99214 OFFICE O/P EST MOD 30 MIN: CPT | Performed by: INTERNAL MEDICINE

## 2019-08-07 SDOH — HEALTH STABILITY: MENTAL HEALTH: HOW OFTEN DO YOU HAVE A DRINK CONTAINING ALCOHOL?: NEVER

## 2019-08-07 ASSESSMENT — PATIENT HEALTH QUESTIONNAIRE - PHQ9
SUM OF ALL RESPONSES TO PHQ9 QUESTIONS 1 AND 2: 0
2. FEELING DOWN, DEPRESSED OR HOPELESS: NOT AT ALL
1. LITTLE INTEREST OR PLEASURE IN DOING THINGS: NOT AT ALL
SUM OF ALL RESPONSES TO PHQ9 QUESTIONS 1 AND 2: 0

## 2019-08-10 ENCOUNTER — LAB SERVICES (OUTPATIENT)
Dept: LAB | Age: 24
End: 2019-08-10

## 2019-08-10 DIAGNOSIS — I82.621 ACUTE DEEP VEIN THROMBOSIS (DVT) OF RIGHT UPPER EXTREMITY, UNSPECIFIED VEIN (CMD): ICD-10-CM

## 2019-08-10 DIAGNOSIS — Z79.01 CURRENT USE OF LONG TERM ANTICOAGULATION: ICD-10-CM

## 2019-08-10 DIAGNOSIS — J98.51 FIBROSING MEDIASTINITIS: ICD-10-CM

## 2019-08-10 DIAGNOSIS — I87.1 SUPERIOR VENA CAVA COMPRESSION SYNDROME: ICD-10-CM

## 2019-08-10 PROCEDURE — 80048 BASIC METABOLIC PNL TOTAL CA: CPT | Performed by: INTERNAL MEDICINE

## 2019-08-11 LAB
ANION GAP SERPL CALC-SCNC: 11 MMOL/L (ref 10–20)
BUN SERPL-MCNC: 17 MG/DL (ref 6–20)
BUN/CREAT SERPL: 16 (ref 7–25)
CALCIUM SERPL-MCNC: 9.2 MG/DL (ref 8.4–10.2)
CHLORIDE SERPL-SCNC: 108 MMOL/L (ref 98–107)
CO2 SERPL-SCNC: 27 MMOL/L (ref 21–32)
CREAT SERPL-MCNC: 1.04 MG/DL (ref 0.67–1.17)
FASTING STATUS PATIENT QL REPORTED: ABNORMAL HRS
GLUCOSE SERPL-MCNC: 79 MG/DL (ref 65–99)
POTASSIUM SERPL-SCNC: 4.2 MMOL/L (ref 3.4–5.1)
SODIUM SERPL-SCNC: 142 MMOL/L (ref 135–145)

## 2019-08-11 ASSESSMENT — ENCOUNTER SYMPTOMS
TREMORS: 0
SEIZURES: 0
POLYPHAGIA: 0
WHEEZING: 0
DIZZINESS: 0
POLYDIPSIA: 0
FATIGUE: 0
UNEXPECTED WEIGHT CHANGE: 0
TROUBLE SWALLOWING: 0
NAUSEA: 0
VOMITING: 0
ABDOMINAL PAIN: 0
BRUISES/BLEEDS EASILY: 0
FACIAL ASYMMETRY: 0
BLOOD IN STOOL: 0
ADENOPATHY: 0
APPETITE CHANGE: 0
FEVER: 0
CHILLS: 0
SHORTNESS OF BREATH: 0
DIAPHORESIS: 0

## 2019-08-23 ENCOUNTER — TELEPHONE (OUTPATIENT)
Dept: SCHEDULING | Age: 24
End: 2019-08-23

## 2019-10-09 ENCOUNTER — TELEPHONE (OUTPATIENT)
Dept: SCHEDULING | Age: 24
End: 2019-10-09

## 2019-10-09 DIAGNOSIS — J98.51 FIBROSING MEDIASTINITIS: Primary | ICD-10-CM

## 2019-10-17 ENCOUNTER — TELEPHONE (OUTPATIENT)
Dept: SCHEDULING | Age: 24
End: 2019-10-17

## 2019-10-18 ENCOUNTER — HOSPITAL (OUTPATIENT)
Dept: OTHER | Age: 24
End: 2019-10-18
Attending: INTERNAL MEDICINE

## 2019-10-24 ENCOUNTER — DOCUMENTATION (OUTPATIENT)
Dept: INTERNAL MEDICINE | Age: 24
End: 2019-10-24

## 2019-10-25 ENCOUNTER — HOSPITAL (OUTPATIENT)
Dept: OTHER | Age: 24
End: 2019-10-25
Attending: INTERNAL MEDICINE

## 2019-10-29 ENCOUNTER — TELEPHONE (OUTPATIENT)
Dept: SCHEDULING | Age: 24
End: 2019-10-29

## 2019-10-30 ENCOUNTER — TELEPHONE (OUTPATIENT)
Dept: SCHEDULING | Age: 24
End: 2019-10-30

## 2020-03-04 ENCOUNTER — TELEPHONE (OUTPATIENT)
Dept: SCHEDULING | Age: 25
End: 2020-03-04

## 2020-07-11 ENCOUNTER — TELEPHONE (OUTPATIENT)
Dept: SCHEDULING | Age: 25
End: 2020-07-11

## 2020-07-13 ENCOUNTER — OFFICE VISIT (OUTPATIENT)
Dept: INTERNAL MEDICINE | Age: 25
End: 2020-07-13

## 2020-07-13 ENCOUNTER — TELEPHONE (OUTPATIENT)
Dept: SCHEDULING | Age: 25
End: 2020-07-13

## 2020-07-13 DIAGNOSIS — I87.1 SUPERIOR VENA CAVA COMPRESSION SYNDROME: Primary | ICD-10-CM

## 2020-07-13 DIAGNOSIS — J98.51 FIBROSING MEDIASTINITIS: ICD-10-CM

## 2020-07-13 PROCEDURE — 99213 OFFICE O/P EST LOW 20 MIN: CPT | Performed by: INTERNAL MEDICINE

## 2020-07-13 SDOH — HEALTH STABILITY: MENTAL HEALTH: HOW OFTEN DO YOU HAVE A DRINK CONTAINING ALCOHOL?: NEVER

## 2020-07-13 ASSESSMENT — PATIENT HEALTH QUESTIONNAIRE - PHQ9
1. LITTLE INTEREST OR PLEASURE IN DOING THINGS: NOT AT ALL
CLINICAL INTERPRETATION OF PHQ9 SCORE: NO FURTHER SCREENING NEEDED
SUM OF ALL RESPONSES TO PHQ9 QUESTIONS 1 AND 2: 0
SUM OF ALL RESPONSES TO PHQ9 QUESTIONS 1 AND 2: 0
2. FEELING DOWN, DEPRESSED OR HOPELESS: NOT AT ALL
CLINICAL INTERPRETATION OF PHQ2 SCORE: NO FURTHER SCREENING NEEDED

## 2020-10-22 ENCOUNTER — TELEPHONE (OUTPATIENT)
Dept: SCHEDULING | Age: 25
End: 2020-10-22

## 2020-10-23 ENCOUNTER — TELEPHONE (OUTPATIENT)
Dept: SCHEDULING | Age: 25
End: 2020-10-23

## 2020-10-27 ENCOUNTER — APPOINTMENT (OUTPATIENT)
Dept: INTERNAL MEDICINE | Age: 25
End: 2020-10-27

## 2020-10-27 ENCOUNTER — TELEPHONE (OUTPATIENT)
Dept: SCHEDULING | Age: 25
End: 2020-10-27

## 2020-10-28 ENCOUNTER — TELEPHONIC VISIT (OUTPATIENT)
Dept: INTERNAL MEDICINE | Age: 25
End: 2020-10-28

## 2020-10-28 DIAGNOSIS — L98.9 FACIAL SKIN LESION: Primary | ICD-10-CM

## 2020-10-28 PROCEDURE — 99213 OFFICE O/P EST LOW 20 MIN: CPT | Performed by: NURSE PRACTITIONER

## 2020-10-28 SDOH — HEALTH STABILITY: MENTAL HEALTH: HOW OFTEN DO YOU HAVE A DRINK CONTAINING ALCOHOL?: NEVER

## 2020-10-28 ASSESSMENT — PATIENT HEALTH QUESTIONNAIRE - PHQ9
SUM OF ALL RESPONSES TO PHQ9 QUESTIONS 1 AND 2: 0
CLINICAL INTERPRETATION OF PHQ2 SCORE: NO FURTHER SCREENING NEEDED
1. LITTLE INTEREST OR PLEASURE IN DOING THINGS: NOT AT ALL
CLINICAL INTERPRETATION OF PHQ9 SCORE: NO FURTHER SCREENING NEEDED
2. FEELING DOWN, DEPRESSED OR HOPELESS: NOT AT ALL
SUM OF ALL RESPONSES TO PHQ9 QUESTIONS 1 AND 2: 0

## 2020-10-28 ASSESSMENT — PAIN SCALES - GENERAL: PAINLEVEL: 0

## 2020-11-23 ENCOUNTER — TELEPHONE (OUTPATIENT)
Dept: SCHEDULING | Age: 25
End: 2020-11-23

## 2020-11-24 ENCOUNTER — OFFICE VISIT (OUTPATIENT)
Dept: INTERNAL MEDICINE | Age: 25
End: 2020-11-24

## 2020-11-24 DIAGNOSIS — Z86.718 PERSONAL HISTORY OF DVT (DEEP VEIN THROMBOSIS): ICD-10-CM

## 2020-11-24 DIAGNOSIS — Z00.00 LABORATORY EXAMINATION ORDERED AS PART OF A ROUTINE GENERAL MEDICAL EXAMINATION: ICD-10-CM

## 2020-11-24 DIAGNOSIS — Z00.00 ANNUAL PHYSICAL EXAM: Primary | ICD-10-CM

## 2020-11-24 DIAGNOSIS — J98.51 FIBROSING MEDIASTINITIS: ICD-10-CM

## 2020-11-24 PROCEDURE — 99395 PREV VISIT EST AGE 18-39: CPT | Performed by: INTERNAL MEDICINE

## 2020-11-24 SDOH — HEALTH STABILITY: MENTAL HEALTH
STRESS IS WHEN SOMEONE FEELS TENSE, NERVOUS, ANXIOUS, OR CAN'T SLEEP AT NIGHT BECAUSE THEIR MIND IS TROUBLED. HOW STRESSED ARE YOU?: NOT AT ALL

## 2020-11-24 SDOH — SOCIAL STABILITY: SOCIAL NETWORK: HOW OFTEN DO YOU GET TOGETHER WITH FRIENDS OR RELATIVES?: ONCE A WEEK

## 2020-11-24 SDOH — HEALTH STABILITY: PHYSICAL HEALTH: ON AVERAGE, HOW MANY MINUTES DO YOU ENGAGE IN EXERCISE AT THIS LEVEL?: 150+ MIN

## 2020-11-24 SDOH — SOCIAL STABILITY: SOCIAL NETWORK: HOW OFTEN DO YOU ATTEND CHURCH OR RELIGIOUS SERVICES?: 1 TO 4 TIMES PER YEAR

## 2020-11-24 SDOH — HEALTH STABILITY: MENTAL HEALTH: HOW OFTEN DO YOU HAVE A DRINK CONTAINING ALCOHOL?: NEVER

## 2020-11-24 SDOH — HEALTH STABILITY: PHYSICAL HEALTH: ON AVERAGE, HOW MANY DAYS PER WEEK DO YOU ENGAGE IN MODERATE TO STRENUOUS EXERCISE (LIKE A BRISK WALK)?: 6 DAYS

## 2020-11-24 SDOH — SOCIAL STABILITY: SOCIAL NETWORK: IN A TYPICAL WEEK, HOW MANY TIMES DO YOU TALK ON THE PHONE WITH FAMILY, FRIENDS, OR NEIGHBORS?: ONCE A WEEK

## 2020-11-24 ASSESSMENT — ENCOUNTER SYMPTOMS
FATIGUE: 0
FEVER: 0
SHORTNESS OF BREATH: 0
EYE PAIN: 0
WHEEZING: 0
APPETITE CHANGE: 0
UNEXPECTED WEIGHT CHANGE: 0
HALLUCINATIONS: 0
SLEEP DISTURBANCE: 0
WEAKNESS: 0
NERVOUS/ANXIOUS: 0
SINUS PAIN: 0
VOMITING: 0
POLYPHAGIA: 0
COUGH: 0
ADENOPATHY: 0
CONSTIPATION: 0
COLOR CHANGE: 0
BRUISES/BLEEDS EASILY: 0
DIZZINESS: 0
VOICE CHANGE: 0
BLOOD IN STOOL: 0
ABDOMINAL PAIN: 0
NAUSEA: 0
HEADACHES: 0
SORE THROAT: 0
NUMBNESS: 0
DIARRHEA: 0
TROUBLE SWALLOWING: 0
POLYDIPSIA: 0
ACTIVITY CHANGE: 0

## 2020-11-24 ASSESSMENT — PAIN SCALES - GENERAL: PAINLEVEL: 0

## 2020-12-02 ENCOUNTER — TELEPHONE (OUTPATIENT)
Dept: INTERNAL MEDICINE | Age: 25
End: 2020-12-02

## 2020-12-04 ENCOUNTER — APPOINTMENT (OUTPATIENT)
Dept: CT IMAGING | Age: 25
End: 2020-12-04
Attending: INTERNAL MEDICINE

## 2020-12-14 ENCOUNTER — TELEPHONE (OUTPATIENT)
Dept: SCHEDULING | Age: 25
End: 2020-12-14

## 2020-12-16 ENCOUNTER — IMAGING SERVICES (OUTPATIENT)
Dept: CT IMAGING | Age: 25
End: 2020-12-16
Attending: INTERNAL MEDICINE

## 2020-12-16 DIAGNOSIS — J98.51 FIBROSING MEDIASTINITIS: ICD-10-CM

## 2020-12-16 PROCEDURE — 71250 CT THORAX DX C-: CPT | Performed by: STUDENT IN AN ORGANIZED HEALTH CARE EDUCATION/TRAINING PROGRAM

## 2020-12-17 ENCOUNTER — TELEPHONE (OUTPATIENT)
Dept: SCHEDULING | Age: 25
End: 2020-12-17

## 2020-12-22 DIAGNOSIS — R93.89 ABNORMAL CT OF THE CHEST: Primary | ICD-10-CM

## 2020-12-22 DIAGNOSIS — J98.51 FIBROSING MEDIASTINITIS: ICD-10-CM

## 2021-01-08 ENCOUNTER — TELEPHONE (OUTPATIENT)
Dept: SCHEDULING | Age: 26
End: 2021-01-08

## 2021-01-14 ENCOUNTER — OFFICE VISIT (OUTPATIENT)
Dept: INTERNAL MEDICINE | Age: 26
End: 2021-01-14

## 2021-01-14 SDOH — HEALTH STABILITY: MENTAL HEALTH: HOW OFTEN DO YOU HAVE A DRINK CONTAINING ALCOHOL?: NEVER

## 2021-01-14 SDOH — SOCIAL STABILITY: SOCIAL NETWORK: HOW OFTEN DO YOU GET TOGETHER WITH FRIENDS OR RELATIVES?: ONCE A WEEK

## 2021-01-14 SDOH — SOCIAL STABILITY: SOCIAL NETWORK: HOW OFTEN DO YOU ATTEND CHURCH OR RELIGIOUS SERVICES?: 1 TO 4 TIMES PER YEAR

## 2021-01-14 SDOH — HEALTH STABILITY: PHYSICAL HEALTH: ON AVERAGE, HOW MANY MINUTES DO YOU ENGAGE IN EXERCISE AT THIS LEVEL?: 150+ MIN

## 2021-01-14 SDOH — SOCIAL STABILITY: SOCIAL NETWORK: IN A TYPICAL WEEK, HOW MANY TIMES DO YOU TALK ON THE PHONE WITH FAMILY, FRIENDS, OR NEIGHBORS?: ONCE A WEEK

## 2021-01-14 SDOH — HEALTH STABILITY: PHYSICAL HEALTH: ON AVERAGE, HOW MANY DAYS PER WEEK DO YOU ENGAGE IN MODERATE TO STRENUOUS EXERCISE (LIKE A BRISK WALK)?: 6 DAYS

## 2021-01-14 ASSESSMENT — PATIENT HEALTH QUESTIONNAIRE - PHQ9
SUM OF ALL RESPONSES TO PHQ9 QUESTIONS 1 AND 2: 0
1. LITTLE INTEREST OR PLEASURE IN DOING THINGS: NOT AT ALL
CLINICAL INTERPRETATION OF PHQ9 SCORE: NO FURTHER SCREENING NEEDED
2. FEELING DOWN, DEPRESSED OR HOPELESS: NOT AT ALL
CLINICAL INTERPRETATION OF PHQ2 SCORE: NO FURTHER SCREENING NEEDED
SUM OF ALL RESPONSES TO PHQ9 QUESTIONS 1 AND 2: 0

## 2021-01-14 ASSESSMENT — PAIN SCALES - GENERAL: PAINLEVEL: 0

## 2021-01-21 ENCOUNTER — TELEPHONE (OUTPATIENT)
Dept: SCHEDULING | Age: 26
End: 2021-01-21

## 2021-01-21 ENCOUNTER — OFFICE VISIT (OUTPATIENT)
Dept: FAMILY MEDICINE | Age: 26
End: 2021-01-21

## 2021-01-21 DIAGNOSIS — B39.9 HISTOPLASMOSIS: ICD-10-CM

## 2021-01-21 DIAGNOSIS — I87.1 SUPERIOR VENA CAVA COMPRESSION SYNDROME: ICD-10-CM

## 2021-01-21 DIAGNOSIS — J84.10 PULMONARY GRANULOMA (CMD): ICD-10-CM

## 2021-01-21 DIAGNOSIS — J98.51 FIBROSING MEDIASTINITIS: Primary | ICD-10-CM

## 2021-01-21 PROCEDURE — 99203 OFFICE O/P NEW LOW 30 MIN: CPT | Performed by: FAMILY MEDICINE

## 2021-01-21 SDOH — HEALTH STABILITY: MENTAL HEALTH: HOW OFTEN DO YOU HAVE A DRINK CONTAINING ALCOHOL?: NEVER

## 2021-01-21 SDOH — HEALTH STABILITY: PHYSICAL HEALTH: ON AVERAGE, HOW MANY DAYS PER WEEK DO YOU ENGAGE IN MODERATE TO STRENUOUS EXERCISE (LIKE A BRISK WALK)?: 0 DAYS

## 2021-01-21 SDOH — HEALTH STABILITY: PHYSICAL HEALTH: ON AVERAGE, HOW MANY MINUTES DO YOU ENGAGE IN EXERCISE AT THIS LEVEL?: NOT ASKED

## 2021-01-21 ASSESSMENT — PAIN SCALES - GENERAL: PAINLEVEL: 0

## 2021-01-21 ASSESSMENT — PATIENT HEALTH QUESTIONNAIRE - PHQ9
CLINICAL INTERPRETATION OF PHQ9 SCORE: NO FURTHER SCREENING NEEDED
SUM OF ALL RESPONSES TO PHQ9 QUESTIONS 1 AND 2: 0
SUM OF ALL RESPONSES TO PHQ9 QUESTIONS 1 AND 2: 0
CLINICAL INTERPRETATION OF PHQ2 SCORE: NO FURTHER SCREENING NEEDED
2. FEELING DOWN, DEPRESSED OR HOPELESS: NOT AT ALL
1. LITTLE INTEREST OR PLEASURE IN DOING THINGS: NOT AT ALL

## 2021-01-25 ENCOUNTER — APPOINTMENT (OUTPATIENT)
Dept: INTERNAL MEDICINE | Age: 26
End: 2021-01-25

## 2021-01-26 ENCOUNTER — TELEPHONE (OUTPATIENT)
Dept: INFECTIOUS DISEASES | Age: 26
End: 2021-01-26

## 2021-01-28 ENCOUNTER — OFFICE VISIT (OUTPATIENT)
Dept: INFECTIOUS DISEASES | Age: 26
End: 2021-01-28

## 2021-01-28 DIAGNOSIS — J98.51 FIBROSING MEDIASTINITIS: Primary | ICD-10-CM

## 2021-01-28 DIAGNOSIS — Z86.19 HISTORY OF HISTOPLASMOSIS: ICD-10-CM

## 2021-01-28 PROCEDURE — 99204 OFFICE O/P NEW MOD 45 MIN: CPT | Performed by: INTERNAL MEDICINE

## 2021-01-28 SDOH — HEALTH STABILITY: MENTAL HEALTH: HOW OFTEN DO YOU HAVE A DRINK CONTAINING ALCOHOL?: NEVER

## 2021-01-28 ASSESSMENT — ENCOUNTER SYMPTOMS
DIARRHEA: 0
ADENOPATHY: 0
BLOOD IN STOOL: 0
SINUS PAIN: 0
CONFUSION: 0
WOUND: 0
ABDOMINAL PAIN: 0
EYE PAIN: 0
NUMBNESS: 0
FATIGUE: 0
WHEEZING: 0
NAUSEA: 0
PHOTOPHOBIA: 0
UNEXPECTED WEIGHT CHANGE: 0
SHORTNESS OF BREATH: 0
COLOR CHANGE: 0
ACTIVITY CHANGE: 0
LIGHT-HEADEDNESS: 0
FEVER: 0
SORE THROAT: 0
RHINORRHEA: 0
CHILLS: 0
SINUS PRESSURE: 0
EYE DISCHARGE: 0
BACK PAIN: 0
COUGH: 0
FACIAL SWELLING: 0
TROUBLE SWALLOWING: 0
VOMITING: 0
WEAKNESS: 0
ABDOMINAL DISTENTION: 0
HEADACHES: 0
APPETITE CHANGE: 0

## 2021-01-28 ASSESSMENT — PAIN SCALES - GENERAL: PAINLEVEL: 0

## 2021-02-02 ENCOUNTER — TELEPHONE (OUTPATIENT)
Dept: CARDIOLOGY | Age: 26
End: 2021-02-02

## 2021-02-03 ENCOUNTER — APPOINTMENT (OUTPATIENT)
Dept: CARDIOLOGY | Age: 26
End: 2021-02-03
Attending: FAMILY MEDICINE

## 2021-02-04 ENCOUNTER — TELEPHONE (OUTPATIENT)
Dept: CARDIOLOGY | Age: 26
End: 2021-02-04

## 2021-03-28 ENCOUNTER — IMMUNIZATION (OUTPATIENT)
Dept: LAB | Age: 26
End: 2021-03-28

## 2021-03-28 DIAGNOSIS — Z23 NEED FOR VACCINATION: Primary | ICD-10-CM

## 2021-03-28 PROCEDURE — 91300 COVID 19 PFIZER-BIONTECH: CPT

## 2021-03-28 PROCEDURE — 0001A COVID 19 PFIZER-BIONTECH: CPT

## 2021-04-18 ENCOUNTER — IMMUNIZATION (OUTPATIENT)
Dept: LAB | Age: 26
End: 2021-04-18
Attending: HOSPITALIST

## 2021-04-18 DIAGNOSIS — Z23 NEED FOR VACCINATION: Primary | ICD-10-CM

## 2021-04-18 PROCEDURE — 91300 COVID 19 PFIZER-BIONTECH: CPT

## 2021-04-18 PROCEDURE — 0002A COVID 19 PFIZER-BIONTECH: CPT

## 2021-05-25 VITALS
BODY MASS INDEX: 27.89 KG/M2 | SYSTOLIC BLOOD PRESSURE: 130 MMHG | SYSTOLIC BLOOD PRESSURE: 112 MMHG | BODY MASS INDEX: 25.96 KG/M2 | TEMPERATURE: 93.8 F | SYSTOLIC BLOOD PRESSURE: 130 MMHG | BODY MASS INDEX: 27.93 KG/M2 | RESPIRATION RATE: 16 BRPM | TEMPERATURE: 99.1 F | OXYGEN SATURATION: 98 % | OXYGEN SATURATION: 97 % | HEART RATE: 71 BPM | HEIGHT: 68 IN | BODY MASS INDEX: 27.93 KG/M2 | WEIGHT: 184.3 LBS | OXYGEN SATURATION: 98 % | DIASTOLIC BLOOD PRESSURE: 70 MMHG | HEART RATE: 64 BPM | DIASTOLIC BLOOD PRESSURE: 72 MMHG | HEIGHT: 68 IN | RESPIRATION RATE: 18 BRPM | DIASTOLIC BLOOD PRESSURE: 60 MMHG | SYSTOLIC BLOOD PRESSURE: 91 MMHG | HEART RATE: 64 BPM | WEIGHT: 171.3 LBS | DIASTOLIC BLOOD PRESSURE: 62 MMHG | HEART RATE: 63 BPM | WEIGHT: 184.3 LBS | OXYGEN SATURATION: 100 % | HEIGHT: 68 IN | WEIGHT: 184 LBS | HEIGHT: 68 IN | BODY MASS INDEX: 27.93 KG/M2 | DIASTOLIC BLOOD PRESSURE: 48 MMHG | HEART RATE: 62 BPM | TEMPERATURE: 97.4 F | RESPIRATION RATE: 15 BRPM | SYSTOLIC BLOOD PRESSURE: 98 MMHG | WEIGHT: 184.3 LBS | HEIGHT: 68 IN | TEMPERATURE: 97.3 F

## 2021-10-21 ENCOUNTER — TELEPHONE (OUTPATIENT)
Dept: SCHEDULING | Age: 26
End: 2021-10-21

## 2021-10-29 ENCOUNTER — IMMUNIZATION (OUTPATIENT)
Dept: INTERNAL MEDICINE | Age: 26
End: 2021-10-29

## 2021-10-29 DIAGNOSIS — Z23 NEED FOR VACCINATION: Primary | ICD-10-CM

## 2021-10-29 PROCEDURE — 0004A COVID 19 PFIZER-BIONTECH: CPT

## 2021-10-29 PROCEDURE — 91300 COVID 19 PFIZER-BIONTECH: CPT

## 2022-01-20 ENCOUNTER — TELEPHONE (OUTPATIENT)
Dept: SCHEDULING | Age: 27
End: 2022-01-20

## 2023-06-15 ENCOUNTER — HOSPITAL ENCOUNTER (OUTPATIENT)
Age: 28
Discharge: HOME | End: 2023-06-15
Payer: COMMERCIAL

## 2023-06-15 VITALS
HEART RATE: 66 BPM | SYSTOLIC BLOOD PRESSURE: 103 MMHG | OXYGEN SATURATION: 100 % | TEMPERATURE: 98 F | DIASTOLIC BLOOD PRESSURE: 84 MMHG | RESPIRATION RATE: 18 BRPM

## 2023-06-15 DIAGNOSIS — M54.50 BACK PAIN, LUMBOSACRAL: Primary | ICD-10-CM

## 2023-06-15 PROCEDURE — 99203 OFFICE O/P NEW LOW 30 MIN: CPT | Performed by: NURSE PRACTITIONER

## 2023-06-15 RX ORDER — CYCLOBENZAPRINE HCL 10 MG
10 TABLET ORAL 3 TIMES DAILY PRN
Qty: 20 TABLET | Refills: 0 | Status: SHIPPED | OUTPATIENT
Start: 2023-06-15 | End: 2023-06-22

## 2023-06-15 RX ORDER — PREDNISONE 20 MG/1
40 TABLET ORAL DAILY
Qty: 10 TABLET | Refills: 0 | Status: SHIPPED | OUTPATIENT
Start: 2023-06-15 | End: 2023-06-20

## 2023-06-15 NOTE — ED INITIAL ASSESSMENT (HPI)
Pt c/o L low back pain after helping lift a sofa then stumbled but did not fall 2 days ago. No urinary symptoms. No bowel or bladder incontinence.

## 2023-06-15 NOTE — DISCHARGE INSTRUCTIONS
You were seen in immediate care today for back pain after lifting a couch. You have been given medication to help with the pain. You have also been given a work note. You may return to work on June 20. Please follow-up with your primary care physician if you need additional time off.

## 2023-07-13 ENCOUNTER — TELEPHONE (OUTPATIENT)
Dept: FAMILY MEDICINE CLINIC | Facility: CLINIC | Age: 28
End: 2023-07-13

## 2023-07-13 NOTE — TELEPHONE ENCOUNTER
Attempted to reach patient regarding No Show appt on 7/07/2023. Call went to  and could not leave msg as MB was full.

## 2023-11-28 ENCOUNTER — HOSPITAL ENCOUNTER (OUTPATIENT)
Age: 28
Discharge: HOME | End: 2023-11-28

## 2023-11-28 VITALS
HEART RATE: 76 BPM | DIASTOLIC BLOOD PRESSURE: 86 MMHG | TEMPERATURE: 98 F | OXYGEN SATURATION: 100 % | SYSTOLIC BLOOD PRESSURE: 135 MMHG | RESPIRATION RATE: 16 BRPM

## 2023-11-28 DIAGNOSIS — R19.7 DIARRHEA, UNSPECIFIED TYPE: Primary | ICD-10-CM

## 2023-11-28 PROCEDURE — 99213 OFFICE O/P EST LOW 20 MIN: CPT | Performed by: NURSE PRACTITIONER

## 2023-11-28 NOTE — ED INITIAL ASSESSMENT (HPI)
Here for eval of abd discomfort and diarrhea that started over night. Reports 3 episodes of non bloody diarrhea. + exposure of \"stomach bug \" from family member.

## 2024-04-19 ENCOUNTER — HOSPITAL ENCOUNTER (OUTPATIENT)
Age: 29
Discharge: HOME | End: 2024-04-19
Payer: COMMERCIAL

## 2024-04-19 VITALS
OXYGEN SATURATION: 99 % | HEART RATE: 75 BPM | RESPIRATION RATE: 18 BRPM | SYSTOLIC BLOOD PRESSURE: 138 MMHG | DIASTOLIC BLOOD PRESSURE: 83 MMHG | TEMPERATURE: 98 F

## 2024-04-19 DIAGNOSIS — S39.012A BACK STRAIN, INITIAL ENCOUNTER: Primary | ICD-10-CM

## 2024-04-19 PROCEDURE — 99213 OFFICE O/P EST LOW 20 MIN: CPT | Performed by: NURSE PRACTITIONER

## 2024-04-19 RX ORDER — PREDNISONE 20 MG/1
40 TABLET ORAL DAILY
Qty: 10 TABLET | Refills: 0 | Status: SHIPPED | OUTPATIENT
Start: 2024-04-19 | End: 2024-04-24

## 2024-04-19 RX ORDER — CYCLOBENZAPRINE HCL 10 MG
TABLET ORAL 3 TIMES DAILY PRN
Qty: 20 TABLET | Refills: 0 | Status: SHIPPED | OUTPATIENT
Start: 2024-04-19 | End: 2024-04-26

## 2024-04-19 NOTE — DISCHARGE INSTRUCTIONS
You may take Motrin 600mg as needed every 6-8 hours. Take this with food.  Take the steroid daily as directed.  Try to take it at the same time each day.  You may use the muscle relaxant as needed for additional pain control however be aware this medication can cause drowsiness.  Rest from exacerbating activities but do not stay sedentary. Follow up with your primary care provider, or the 1 provided, within the next 3 days - if symptoms do not resolve, you may need further treatment. Seek additional care in the ER immediately for numbness in your groin, loss of bowel or bladder function, inability to walk, or other new/worsening symptoms.

## 2024-04-19 NOTE — ED PROVIDER NOTES
Patient Seen in: [facility]    History   CC: back pain  HPI: Kamron Vázquez 28 year old male  who presents c/o back pain which he states is mid/low back which first began a few days ago while carrying a bag of heavy cat litter on 4/16/2024. 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 Also exacerbated with movement/positionally. Denies cp or lexis. Denies URI s/s, fever, rash, gi s/s, urinary s/s, or radicular signs/symptoms such as numbness, tingling, weakness or limitation in range of motion to his upper or lower extremities.  Denies saddle anesthesia or loss of bowel or bladder control.    History reviewed. No pertinent past medical history.    History reviewed. No pertinent surgical history.    No family history on file.    Social History     Socioeconomic History    Marital status: Single   Tobacco Use    Smoking status: Never    Smokeless tobacco: Former   Vaping Use    Vaping status: Every Day   Substance and Sexual Activity    Alcohol use: Yes     Comment: occ    Drug use: Yes     Types: Cannabis     Social Determinants of Health     Physical Activity: Unknown (1/21/2021)    [facility] [employer] [employer]    Exercise Vital Sign     Days of Exercise per Week: 0 days     Minutes of Exercise per Session: Not asked    Received from [employer] [employer]    Stress       ROS:  Review of Systems    Positive for stated complaint: Back Pain  Other systems are as noted in HPI.  Constitutional and vital signs reviewed.      All other systems reviewed and negative except as noted above.    PSFH elements reviewed from today and agreed except as otherwise stated in HPI.             Constitutional and vital signs reviewed.        Physical Exam     ED Triage Vitals [04/19/24 1052]   /83   Pulse 75   Resp 18   Temp 98.3 °F (36.8 °C)   Temp src Oral   SpO2 99 %   O2 Device None (Room air)       Current:BP  138/83   Pulse 75   Temp 98.3 °F (36.8 °C) (Oral)   Resp 18   SpO2 99%         PE:  General - Appears well, non-toxic and in NAD  Head - Appears symmetrical without deformity/swelling cranium, scalp, or facial bones  Neck - supple with trachea midline, no tenderness upon palpation to posterior c-spine, no obvious sign of trauma/swelling/step-off, full ROM with strong motor strength against resistance  Resp - Lung sounds clear bilaterally and wob unlabored, good aeration with equal, even expansion bilaterally   CV - RRR  GI - Appears round and flat, BS +x4 quadrants, no tenderness/guarding with palpation   - No CVA tenderness bilat  Skin - no rashes or petechiae noted, pink warm and dry throughout, mmm, no obvious signs of swelling/trauma/deformity, cap refill <2seconds  Neuro - A&O x4, sensation equal to both medial and lateral aspects of extremities, steady gait  MSK - makes purposeful movements of all extremities with full ROM noted,  hand grasp strength equal bilat, radial pulses 2+ bilat.  no midline spinal tenderness or obvious sign of trauma/swelling/deformity, +flexion of the 1st and 5th toes bilat.  + Some reproducible muscular tenderness elicited with palpation to the thoracolumbar region bilaterally  Psych - Interactive and appropriate      ED Course   Labs Reviewed - No data to display    MDM     DDx: back strain, radiculopathy, epidural abscess, cauda equina     Patient to immediate care with reported back pain which first began while working, carrying heavy bag. +exacerbated with movement and positionally. Unsure if he will file as WC. Denies lexis, cp, fever, swelling, gi s/s, saddle anesthesia or loss of bowel or bladder control.  Neurovascular exam intact.  Will discharge home at this time with rest, hydration instructions, Tylenol or Motrin as needed for discomfort, prescriptions as written and indications/precautions on use reviewed, follow-up and return/ED precautions reviewed.  Patient is  historian and demonstrates understanding of all instruction and agrees with plan of care.      Disposition and Plan     Clinical Impression:  1. Back strain, initial encounter        Disposition:  Discharge    Follow-up:  Everardo Velarde MD  [address] [place] [id number] [phone]    Go in 1 week  As needed      Medications Prescribed:  Current Discharge Medication List        START taking these medications    Details   predniSONE 20 MG Oral Tab Take 2 tablets (40 mg total) by mouth daily for 5 days.  Qty: 10 tablet, Refills: 0      cyclobenzaprine 10 MG Oral Tab Take 0.5-1 tablets (5-10 mg total) by mouth 3 (three) times daily as needed for Muscle spasms.  Qty: 20 tablet, Refills: 0

## 2024-04-19 NOTE — ED INITIAL ASSESSMENT (HPI)
Middle spine back feels tight when he brings his arms together. Ross. Hx of fibrocyn mediastinitis

## 2024-04-24 ENCOUNTER — MED REC SCAN ONLY (OUTPATIENT)
Dept: INTERNAL MEDICINE CLINIC | Facility: CLINIC | Age: 29
End: 2024-04-24

## 2024-10-05 ENCOUNTER — HOSPITAL ENCOUNTER (OUTPATIENT)
Age: 29
Discharge: HOME | End: 2024-10-05
Payer: COMMERCIAL

## 2024-10-05 VITALS
TEMPERATURE: 98 F | HEART RATE: 53 BPM | SYSTOLIC BLOOD PRESSURE: 141 MMHG | DIASTOLIC BLOOD PRESSURE: 83 MMHG | OXYGEN SATURATION: 100 % | RESPIRATION RATE: 20 BRPM

## 2024-10-05 DIAGNOSIS — R53.83 OTHER FATIGUE: Primary | ICD-10-CM

## 2024-10-05 PROCEDURE — 99213 OFFICE O/P EST LOW 20 MIN: CPT | Performed by: PHYSICIAN ASSISTANT

## 2024-10-05 NOTE — ED PROVIDER NOTES
Patient Seen in: [facility]      History     Chief Complaint   Patient presents with    Fatigue     Stated Complaint: Fatigue    Subjective:   HPI      Pt had hx DVT after IV insertion - previously on eliquis. 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 Pt presents with evaluation of fatigue. Pt is chronically fatigued, no new change in character/severity. Denies any current pain or new symptoms. He was talking to mom about feeling tired and he came in for evaluation today.   Not currently with PCP - last time he saw PCP was 4 years prior.   No F/C, cough, hemoptysis, CP, SOB, N/V, abdominal pain, dizziness, leg pain/swelling. No symptoms with physical exertion.   No tobacco. Daily THC use.  No ilicit drug use. No etoh.   No recent sx/travel, no recent immobilization. No known active cancer.       Objective:     Past Medical History:    Fibrosing mediastinitis              History reviewed. No pertinent surgical history.             Social History     Socioeconomic History    Marital status: Single   Tobacco Use    Smoking status: Never     Passive exposure: Never    Smokeless tobacco: Former   Vaping Use    Vaping status: Former   Substance and Sexual Activity    Alcohol use: Not Currently    Drug use: Yes     Types: Cannabis     Social Determinants of Health     Physical Activity: Unknown (1/21/2021)    Received from [employer]    Exercise Vital Sign     Days of Exercise per Week: 0 days     Minutes of Exercise per Session: Not asked    [facility] [employer] [employer]    Stress              Review of Systems    Positive for stated complaint: Fatigue  Other systems are as noted in HPI.  Constitutional and vital signs reviewed.      All other systems reviewed and negative except as noted above.    Physical Exam     ED Triage Vitals [10/05/24  0900]   /83   Pulse 53   Resp 20   Temp 98 °F (36.7 °C)   Temp src Temporal   SpO2 100 %   O2 Device None (Room air)       Current Vitals:   Vital Signs  BP: 141/83  Pulse: 53  Resp: 20  Temp: 98 °F (36.7 °C)  Temp src: Temporal    Oxygen Therapy  SpO2: 100 %  O2 Device: None (Room air)        Physical Exam  Vitals and nursing note reviewed.   Constitutional:       General: He is not in acute distress.     Appearance: Normal appearance. He is not ill-appearing, toxic-appearing or diaphoretic.   HENT:      Head: Normocephalic.      Nose: Nose normal.   Eyes:      Conjunctiva/sclera: Conjunctivae normal.   Cardiovascular:      Rate and Rhythm: Normal rate and regular rhythm.   Pulmonary:      Effort: Pulmonary effort is normal. No respiratory distress.      Breath sounds: Normal breath sounds.   Musculoskeletal:         General: Normal range of motion.      Cervical back: Normal range of motion.      Comments: No calf ttp or swelling bilaterally    Skin:     General: Skin is warm.   Neurological:      General: No focal deficit present.      Mental Status: He is alert.   Psychiatric:         Mood and Affect: Mood normal.         Behavior: Behavior normal.           ED Course   Labs Reviewed - No data to display            MDM       Medical Decision Making  30 y/o M presents for evaluation of chronic fatigue.   Afebrile, VSS. Nontoxic. Asymptomatic in the clinic. No change in character or severity of the chronic fatigue.   Differential sleep apnea vs anemia vs thyroid dysfunction vs depression.   Do not suspect any acute or life threatening disease at this time.  Pt brought up concern for DVT, but no acute symptoms. Vitals stable. Pt is asymptomatic on exam.   Discussed need for close PCP follow up for further evaluation and management of chronic fatigue. ER precautions advised if any symptoms developed.   Discussed case with Dr. Wisdom. Agreeable with management and dispo.                 Disposition and Plan      Clinical Impression:  1. Other fatigue         Disposition:  Discharge  10/5/2024  9:51 am    Follow-up:  Everardo Velarde MD  [address] [place] [id number]  [phone]    In 2 days      [facility] [address] [address]  [phone]              Medications Prescribed:  There are no discharge medications for this patient.          Supplementary Documentation:

## 2024-10-05 NOTE — ED INITIAL ASSESSMENT (HPI)
Fatigue x 1.5 weeks more than usual. Patient states  he was dx  with fibrosing mediastinitis  in 2018. Has not followed up with provider in years. States that he was speaking with mother about her hx of blood clots which caused patient to worry about his own symptoms of fatigue and decided to come in.

## 2024-10-05 NOTE — DISCHARGE INSTRUCTIONS
Seek immediate medical attention if any fevers, headaches, chest pain, shortness of breath, vomiting, palpitations, extreme fatigue, difficulty swallowing, leg pain or swelling, worsening of symptoms or any other concerns

## 2024-10-07 ENCOUNTER — OFFICE VISIT (OUTPATIENT)
Dept: INTERNAL MEDICINE CLINIC | Facility: CLINIC | Age: 29
End: 2024-10-07

## 2024-10-07 VITALS
HEART RATE: 55 BPM | WEIGHT: 216 LBS | SYSTOLIC BLOOD PRESSURE: 116 MMHG | BODY MASS INDEX: 32.74 KG/M2 | DIASTOLIC BLOOD PRESSURE: 52 MMHG | HEIGHT: 68 IN | RESPIRATION RATE: 16 BRPM | TEMPERATURE: 98 F | OXYGEN SATURATION: 98 %

## 2024-10-07 DIAGNOSIS — Z28.21 INFLUENZA VACCINE REFUSED: ICD-10-CM

## 2024-10-07 DIAGNOSIS — R53.83 OTHER FATIGUE: ICD-10-CM

## 2024-10-07 DIAGNOSIS — B58.9 TOXOPLASMOSIS: ICD-10-CM

## 2024-10-07 DIAGNOSIS — I87.1 SVC SYNDROME: ICD-10-CM

## 2024-10-07 DIAGNOSIS — Z00.00 ANNUAL PHYSICAL EXAM: Primary | ICD-10-CM

## 2024-10-07 DIAGNOSIS — J98.51 FIBROSING MEDIASTINITIS: ICD-10-CM

## 2024-10-13 NOTE — PROGRESS NOTES
Subjective:     Patient ID: Kamron Vázquez is a 29 year old male.    HPI    Patient comes in today first time to establish care for annual physical exam patient has history of SVC syndrome also with history of fibrosing mediastinitis with toxoplasmosis he has follow-up with specialist as have the workup done  Patient does get fatigue needs FMLA for paperwork at times he has to take him time off as needed above symptoms    History/Other:   Review of Systems   Constitutional:  Positive for fatigue. Negative for fever.   HENT: Negative.  Negative for congestion.    Eyes: Negative.    Respiratory: Negative.  Negative for cough, shortness of breath and wheezing.    Cardiovascular: Negative.  Negative for chest pain, palpitations and leg swelling.   Gastrointestinal: Negative.    Endocrine: Negative for cold intolerance and heat intolerance.   Genitourinary: Negative.  Negative for dysuria, flank pain and hematuria.   Musculoskeletal: Negative.  Negative for arthralgias, back pain and myalgias.   Skin: Negative.    Neurological: Negative.  Negative for dizziness, tremors, syncope, weakness and headaches.   Psychiatric/Behavioral: Negative.  Negative for agitation, behavioral problems and suicidal ideas. The patient is not nervous/anxious.      No current outpatient medications on file.     Allergies:Allergies[1]    Past Medical History:    Fibrosing mediastinitis      No past surgical history on file.   No family history on file.   Social History:   Social History     Socioeconomic History    Marital status: Single   Tobacco Use    Smoking status: Never     Passive exposure: Never    Smokeless tobacco: Former   Vaping Use    Vaping status: Former   Substance and Sexual Activity    Alcohol use: Not Currently    Drug use: Yes     Types: Cannabis     Social Drivers of Health     Physical Activity: Unknown (1/21/2021)    Received from [employer] [employer]    Exercise Vital Sign     Days of  Exercise per Week: 0 days     Minutes of Exercise per Session: Not asked    Received from [facility] [address], [facility]    Stress        Objective:   Physical Exam  Vitals and nursing note reviewed.   Constitutional:       Appearance: He is well-developed.   HENT:      Head: Normocephalic and atraumatic.      Right Ear: External ear normal.      Left Ear: External ear normal.      Nose: Nose normal.   Eyes:      Conjunctiva/sclera: Conjunctivae normal.      Pupils: Pupils are equal, round, and reactive to light.   Cardiovascular:      Rate and Rhythm: Normal rate and regular rhythm.      Heart sounds: Normal heart sounds.   Pulmonary:      Effort: Pulmonary effort is normal.      Breath sounds: Normal breath sounds.   Abdominal:      General: Bowel sounds are normal.      Palpations: Abdomen is soft.   Genitourinary:     Penis: Normal.       Prostate: Normal.      Rectum: Normal.   Musculoskeletal:         General: Normal range of motion.      Cervical back: Normal range of motion and neck supple.   Skin:     General: Skin is warm and dry.   Neurological:      Mental Status: He is alert and oriented to person, place, and time.      Deep Tendon Reflexes: Reflexes are normal and symmetric.         Assessment & Plan:   1. Annual physical exam exam as above we will do labs   2. Other fatigue chronic   3. SVC syndrome chronic stable    4. Fibrosing mediastinitis chronic stable will refer to pulmonology    5. Toxoplasmosis as above follow-up with specialist   6. Influenza vaccine refused        Orders Placed This Encounter   Procedures    CBC With Differential With Platelet    Comp Metabolic Panel (14)    Lipid Panel    TSH W Reflex To Free T4    Urinalysis, Routine       Meds This Visit:  Requested Prescriptions      No prescriptions requested or ordered in this encounter       Imaging & Referrals:  PULMONARY - INTERNAL            [1] No Known Allergies

## 2024-10-21 ENCOUNTER — TELEPHONE (OUTPATIENT)
Dept: INTERNAL MEDICINE CLINIC | Facility: CLINIC | Age: 29
End: 2024-10-21

## 2024-10-21 ENCOUNTER — OFFICE VISIT (OUTPATIENT)
Dept: INTERNAL MEDICINE CLINIC | Facility: CLINIC | Age: 29
End: 2024-10-21

## 2024-10-21 ENCOUNTER — LAB ENCOUNTER (OUTPATIENT)
Dept: LAB | Age: 29
End: 2024-10-21
Attending: INTERNAL MEDICINE
Payer: COMMERCIAL

## 2024-10-21 VITALS
BODY MASS INDEX: 32.13 KG/M2 | SYSTOLIC BLOOD PRESSURE: 106 MMHG | RESPIRATION RATE: 16 BRPM | TEMPERATURE: 98 F | HEIGHT: 68 IN | OXYGEN SATURATION: 100 % | WEIGHT: 212 LBS | DIASTOLIC BLOOD PRESSURE: 68 MMHG | HEART RATE: 62 BPM

## 2024-10-21 DIAGNOSIS — Z00.00 ANNUAL PHYSICAL EXAM: ICD-10-CM

## 2024-10-21 DIAGNOSIS — J98.51 FIBROSING MEDIASTINITIS: ICD-10-CM

## 2024-10-21 DIAGNOSIS — B58.9 TOXOPLASMOSIS: ICD-10-CM

## 2024-10-21 DIAGNOSIS — I87.1 SVC SYNDROME: ICD-10-CM

## 2024-10-21 DIAGNOSIS — R53.83 OTHER FATIGUE: ICD-10-CM

## 2024-10-21 DIAGNOSIS — R25.3 EYELID TWITCH: Primary | ICD-10-CM

## 2024-10-21 LAB
ALBUMIN SERPL-MCNC: 4.5 G/DL (ref 3.2–4.8)
ALBUMIN/GLOB SERPL: 1.7 {RATIO} (ref 1–2)
ALP LIVER SERPL-CCNC: 62 U/L
ALT SERPL-CCNC: 16 U/L
ANION GAP SERPL CALC-SCNC: 7 MMOL/L (ref 0–18)
AST SERPL-CCNC: 24 U/L (ref ?–34)
BASOPHILS # BLD AUTO: 0.05 X10(3) UL (ref 0–0.2)
BASOPHILS NFR BLD AUTO: 1.1 %
BILIRUB SERPL-MCNC: 1 MG/DL (ref 0.3–1.2)
BILIRUB UR QL: NEGATIVE
BUN BLD-MCNC: 17 MG/DL (ref 9–23)
BUN/CREAT SERPL: 15.6 (ref 10–20)
CALCIUM BLD-MCNC: 9.9 MG/DL (ref 8.7–10.4)
CHLORIDE SERPL-SCNC: 107 MMOL/L (ref 98–112)
CHOLEST SERPL-MCNC: 154 MG/DL (ref ?–200)
CLARITY UR: CLEAR
CO2 SERPL-SCNC: 29 MMOL/L (ref 21–32)
COLOR UR: YELLOW
CREAT BLD-MCNC: 1.09 MG/DL
DEPRECATED RDW RBC AUTO: 42.5 FL (ref 35.1–46.3)
EGFRCR SERPLBLD CKD-EPI 2021: 95 ML/MIN/1.73M2 (ref 60–?)
EOSINOPHIL # BLD AUTO: 0.3 X10(3) UL (ref 0–0.7)
EOSINOPHIL NFR BLD AUTO: 6.7 %
ERYTHROCYTE [DISTWIDTH] IN BLOOD BY AUTOMATED COUNT: 11.7 % (ref 11–15)
FASTING PATIENT LIPID ANSWER: YES
FASTING STATUS PATIENT QL REPORTED: YES
GLOBULIN PLAS-MCNC: 2.6 G/DL (ref 2–3.5)
GLUCOSE BLD-MCNC: 94 MG/DL (ref 70–99)
GLUCOSE UR-MCNC: NORMAL MG/DL
HCT VFR BLD AUTO: 40.8 %
HDLC SERPL-MCNC: 71 MG/DL (ref 40–59)
HGB BLD-MCNC: 13.9 G/DL
HGB UR QL STRIP.AUTO: NEGATIVE
IMM GRANULOCYTES # BLD AUTO: 0.01 X10(3) UL (ref 0–1)
IMM GRANULOCYTES NFR BLD: 0.2 %
KETONES UR-MCNC: NEGATIVE MG/DL
LDLC SERPL CALC-MCNC: 71 MG/DL (ref ?–100)
LEUKOCYTE ESTERASE UR QL STRIP.AUTO: NEGATIVE
LYMPHOCYTES # BLD AUTO: 1.92 X10(3) UL (ref 1–4)
LYMPHOCYTES NFR BLD AUTO: 43.1 %
MCH RBC QN AUTO: 33.4 PG (ref 26–34)
MCHC RBC AUTO-ENTMCNC: 34.1 G/DL (ref 31–37)
MCV RBC AUTO: 98.1 FL
MONOCYTES # BLD AUTO: 0.48 X10(3) UL (ref 0.1–1)
MONOCYTES NFR BLD AUTO: 10.8 %
NEUTROPHILS # BLD AUTO: 1.69 X10 (3) UL (ref 1.5–7.7)
NEUTROPHILS # BLD AUTO: 1.69 X10(3) UL (ref 1.5–7.7)
NEUTROPHILS NFR BLD AUTO: 38.1 %
NITRITE UR QL STRIP.AUTO: NEGATIVE
NONHDLC SERPL-MCNC: 83 MG/DL (ref ?–130)
OSMOLALITY SERPL CALC.SUM OF ELEC: 297 MOSM/KG (ref 275–295)
PH UR: 5.5 [PH] (ref 5–8)
PLATELET # BLD AUTO: 250 10(3)UL (ref 150–450)
POTASSIUM SERPL-SCNC: 4 MMOL/L (ref 3.5–5.1)
PROT SERPL-MCNC: 7.1 G/DL (ref 5.7–8.2)
PROT UR-MCNC: NEGATIVE MG/DL
RBC # BLD AUTO: 4.16 X10(6)UL
SODIUM SERPL-SCNC: 143 MMOL/L (ref 136–145)
SP GR UR STRIP: 1.03 (ref 1–1.03)
TRIGL SERPL-MCNC: 60 MG/DL (ref 30–149)
TSI SER-ACNC: 0.67 MIU/ML (ref 0.55–4.78)
UROBILINOGEN UR STRIP-ACNC: NORMAL
VLDLC SERPL CALC-MCNC: 9 MG/DL (ref 0–30)
WBC # BLD AUTO: 4.5 X10(3) UL (ref 4–11)

## 2024-10-21 PROCEDURE — 3074F SYST BP LT 130 MM HG: CPT | Performed by: INTERNAL MEDICINE

## 2024-10-21 PROCEDURE — 3078F DIAST BP <80 MM HG: CPT | Performed by: INTERNAL MEDICINE

## 2024-10-21 PROCEDURE — 84443 ASSAY THYROID STIM HORMONE: CPT

## 2024-10-21 PROCEDURE — 80061 LIPID PANEL: CPT

## 2024-10-21 PROCEDURE — 81003 URINALYSIS AUTO W/O SCOPE: CPT

## 2024-10-21 PROCEDURE — 85025 COMPLETE CBC W/AUTO DIFF WBC: CPT

## 2024-10-21 PROCEDURE — 80053 COMPREHEN METABOLIC PANEL: CPT

## 2024-10-21 PROCEDURE — 99213 OFFICE O/P EST LOW 20 MIN: CPT | Performed by: INTERNAL MEDICINE

## 2024-10-21 PROCEDURE — 36415 COLL VENOUS BLD VENIPUNCTURE: CPT

## 2024-10-21 PROCEDURE — 3008F BODY MASS INDEX DOCD: CPT | Performed by: INTERNAL MEDICINE

## 2024-10-21 NOTE — PROGRESS NOTES
Subjective:     Patient ID: Kamron Vázquez is a 29 year old male.    HPI  Patient here with complaint of left eye twitching as per patient has been going on for many years and happens occasionally last for few seconds never longer no vision changes no ptosis.  Also asking were to turn in the paperwork for FMLA  History/Other:   Review of Systems   Constitutional: Negative.    HENT: Negative.     Eyes: Negative.         Left eye twitching    Respiratory: Negative.     Cardiovascular: Negative.    Gastrointestinal: Negative.    Genitourinary: Negative.    Musculoskeletal: Negative.    Skin: Negative.    Neurological: Negative.    Psychiatric/Behavioral: Negative.       No current outpatient medications on file.     Allergies:Allergies[1]    Past Medical History:    Fibrosing mediastinitis      No past surgical history on file.   No family history on file.   Social History:   Social History     Socioeconomic History    Marital status: Single   Tobacco Use    Smoking status: Never     Passive exposure: Never    Smokeless tobacco: Former   Vaping Use    Vaping status: Former   Substance and Sexual Activity    Alcohol use: Not Currently    Drug use: Yes     Types: Cannabis     Social Drivers of Health     Physical Activity: Unknown (1/21/2021)    Received from [employer] [employer]    Exercise Vital Sign     Days of Exercise per Week: 0 days     Minutes of Exercise per Session: Not asked    Received from [facility]    Stress        Objective:   Physical Exam  Vitals and nursing note reviewed.   Constitutional:       Appearance: He is well-developed.   HENT:      Head: Normocephalic and atraumatic.      Right Ear: External ear normal.      Left Ear: External ear normal.      Nose: Nose normal.   Eyes:      General:         Right eye: No discharge.         Left eye: No discharge.      Conjunctiva/sclera: Conjunctivae normal.      Pupils: Pupils are equal,  round, and reactive to light.      Comments: No ptosis, no vision changes    Cardiovascular:      Rate and Rhythm: Normal rate and regular rhythm.      Heart sounds: Normal heart sounds.   Pulmonary:      Effort: Pulmonary effort is normal.      Breath sounds: Normal breath sounds.   Abdominal:      General: Bowel sounds are normal.      Palpations: Abdomen is soft.   Genitourinary:     Penis: Normal.       Prostate: Normal.      Rectum: Normal.   Musculoskeletal:         General: Normal range of motion.      Cervical back: Normal range of motion and neck supple.   Skin:     General: Skin is warm and dry.   Neurological:      Mental Status: He is alert and oriented to person, place, and time.      Deep Tendon Reflexes: Reflexes are normal and symmetric.         Assessment & Plan:   1. Eyelid twitch chronic Steinken on and off nothing to be concerned make sure to use eyedrops to make sure there is no irritation if symptoms worsen or last longer or any other symptoms like eyelid drooping then let us know       No orders of the defined types were placed in this encounter.      Meds This Visit:  Requested Prescriptions      No prescriptions requested or ordered in this encounter       Imaging & Referrals:  None            [1] No Known Allergies

## 2025-01-21 NOTE — PROGRESS NOTES
Initial Pulmonary Medicine Outpatient Consultation           Reason for Consultation and CC: fibrosing mediastinitis     Referring Physician: Dr. Velarde        HPI: I had the pleasure of seeing Kamron Vázquez for a Pulmonary Medicine consultation on 1/22/25.  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 Pt was diagnosed on 6/22/18. Per ID notes from [facility] from 1/2021, he didn't need any further treatment.   Reports he's been seen at [facility] and [facility] in the past. Last time he saw a provider for his diagnosis was in 2021.  Reports he was on Eliquis in the past but is supposed to be on ASA now but hasn't been taking it.   We don't have records from [facility] at the time of this visit.  Respiratory symptoms at this time:  -denies shortness of breath, coughing, wheezing, chest congestion, chest discomfort, fevers, chills, sweats.   Not on inhalers, nebs, or supp 02.  Does report he grinds his teeth when sleeping and wife told him he snores.  Wife has told him he stops breathing while sleeping as well.   Wakes up tired in the morning.  Denies daytime naps or dozing off but would like to take a nap if he could.  Has a family hx of sleep apnea. Dad used CPAP in the past.        REVIEW OF SYSTEMS:  Positives and negatives as stated in HPI. Remainder of 12 pt review of systems otherwise are negative.       PAST MEDICAL HISTORY:  Past Medical History:    Fibrosing mediastinitis       PAST SURGICAL HISTORY:  No past surgical history on file.       PAST FAMILY HISTORY:  Dad has TREVOR on CPAP therapy  Mom side has CAD      PAST SOCIAL HISTORY:  Social History     Socioeconomic History    Marital status: Single   Tobacco Use    Smoking status: Never     Passive exposure: Never    Smokeless tobacco: Former   Vaping Use    Vaping status: Former   Substance and Sexual Activity    Alcohol use: Not Currently    Drug use: Yes      Types: Cannabis     Social Drivers of Health     Physical Activity: Unknown (1/21/2021)    [facility]    Exercise Vital Sign     Days of Exercise per Week: 0 days     Minutes of Exercise per Session: Not asked    Received from [facility]    Stress   Smokes marijuana regularly after work. Has plans to quit  Lives with wife  Has 3 cats and 1 dog  Employed: post office mailman      ALLERGIES:  Allergies[1]       MEDS:  Medications Ordered Prior to Encounter[2]       PHYSICAL EXAM:  /66   Pulse 67   Resp 14   Ht 5' 8\" (1.727 m)   Wt 213 lb (96.6 kg)   SpO2 98%   BMI 32.39 kg/m²   CONSTITUTIONAL: alert, oriented, no apparent distress  HEENT: atraumatic normocephalic  MOUTH: mucous membranes are moist. No OP exudates. MMP 3  NECK/THROAT: no JVD. Trachea midline. No obvious thyromegaly  LUNG: clear b/l no wheezing, crackles. Chest symmetric with respiratory motion  HEART: regular rate and rhythm, no obvious murmers or gallops note  ABD: soft non tender. + bowel sounds. No organomegaly noted  EXT: no clubbing, cyanosis, or edema noted. Pulses intact grossly  NEURO/MUSCULOSKELETAL: no gross deficits  SKIN: warm, dry. No obvious lesions noted  LYMPH: no obvious LAD       IMAGES:  Chest CT w/o contrast 12/2020 (care everywhere report only from [employer] [employer])  FINDINGS:   A 1.0 cm right upper lobe pulmonary nodule has increased in size, previously 0.8 cm, and now demonstrates central cavitation. There is no focal consolidation, pleural effusion or pneumothorax. A calcified pulmonary nodule in the right lower lobe is   unchanged and is likely a sequela of prior granulomatous disease.  The heart size is normal. There is no pericardial effusion. The thoracic aorta and pulmonary artery appear normal. A partially calcified lymph pretracheal lymph node is unchanged measuring 6.0 x 4.5 x 6.0 cm. Additional calcified right hilar  and   subcarinal lymph nodes are unchanged. Multiple additional soft tissue nodules/lymph nodes are again seen in the prevascular space and aortopulmonary window. Multiple subcutaneous venous collaterals are seen in the anterior chest wall, grossly unchanged   in extent. Multiple punctate calcified granulomas are scattered in the spleen and liver. The bones appear intact.   IMPRESSION:   1.  Multiple enlarged soft tissue nodules/lymph nodes throughout the mediastinum and right hilum with a dominant calcified mass in the right paratracheal region, likely representing sequelae of chronic granulomatous disease or other   infectious/inflammatory etiologies, overall unchanged in extent since 2/25/2019. Multiple additional calcified granulomas in the right lung, spleen, and liver.   2.   Increased size of a right upper lobe pulmonary nodule now demonstrating central cavitation, likely representing evolution of an infectious/inflammatory nodule.   3.   Unchanged extent of multiple subcutaneous venous collaterals in the anterior chest wall.     Chest CT with contrast 5/24/18   FINDINGS:   CHEST:   Corresponding to the abnormal chest x-ray there is a soft tissue mass with central calcification    in the right paratracheal region which measures 6.5 cm in length and 4.6 cm in width.  AP    diameter measures 6.4 cm.  Displacement of the trachea to the left slightly is identified and    there is slight flattening of the trachea on its right side.   Extensive mediastinal and anterior chest wall collaterals are identified.  There are also seen    along the course of the internal mammary artery.  Prominent collateral vessels are also seen in    the azygos and hemiazygos as well as along the course of the thoracic spine on the right side in    particular.  A normal superior vena cava is not identified.  An enlarged inferior vena cava is    identified as only the collaterals are all emptying into the inferior vena cava explaining its     enlargement.   Heart size and origins of the great vessels unremarkable.  Pleural or pericardial effusions not    seen.   Skeletal structures and soft tissues: Age-appropriate degenerative changes. No suspicious osseous    lytic or blastic process. No soft tissue abnormality.   IMPRESSION:    Soft tissue mass with dense calcification in the anterior mediastinum to the right.    Differential diagnosis would include treated lymphoma, thymoma, thymic cancer or possibly    teratoma.   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   Presumably the patient has had a prior CT scan or other imaging done elsewhere since the abnormal    chest x-ray done one year ago.  Comparison to those reports would be extremely helpful.  If    older CT scans could be made available for comparison the 2nd report will be forthcoming.   Oncology consultation may be helpful.   F I N A L    Dictated By:            YASMIN KAHN MD   Electronically Reviewed and Approved By:           YASMIN KAHN MD  05/24/18 10:53 am   COMMENT Result Annotated 05/30/2018 09:26 by OLI ERAZO: NEEDS TO SEE THORACIC SURGEON FOR FURTHER REVIEW/MANAGEMENT NEEDS TO SEE THORACIC SPECIALIST CT SHOWED A LARGE ANTERIOR MEDIASTINAL TUMOR. COMMENT        LABS:  Lab Results   Component Value Date    WBC 4.5 10/21/2024    RBC 4.16 (L) 10/21/2024    HGB 13.9 10/21/2024    HCT 40.8 10/21/2024    MCV 98.1 10/21/2024    MCH 33.4 10/21/2024    MCHC 34.1 10/21/2024     No results for input(s): \"GLU\", \"BUN\", \"CREATSERUM\", \"GFRAA\", \"GFRNAA\", \"EGFRCR\", \"CA\", \"ALB\", \"NA\", \"K\", \"CL\", \"CO2\", \"ALKPHO\", \"AST\", \"ALT\", \"BILT\", \"TP\" in the last 168 hours.       PFTS none available to review      PSG/CPAP titration results:  None        ASSESSMENT/PLAN:  Fibrosing mediastinitis diagnosed in 2018  -Seen at [facility] and [facility]. Pt reports the last time was in 2021  -Denies any new  respiratory symptoms  -Have him sign medical release to access [facility] records and request previous chest imaging  -Future testing (PFT, chest imaging) based on review of previous records    Snoring and hypersomnolence concerning for sleep apnea  -Also has a family hx of TREVOR  -Schedule a sleep study.  Contact the number on the After Visit Summary (AVS):  -Strongly advise absolutely NO driving when sleepy  -Recommend good sleep hygiene as discussed   -Avoid alcohol and caffeine before going to bed    Smokes marijuana  -Advised to quit completely    RTC in 1-2 months    Thank you for the opportunity to care for Kamron Vázquez.    I spent a total of 46 minutes in direct contact with the patient and reviewing pertinent outside records on the day of the encounter.     ZAID Taylor DO, MPH  Pulmonary and Critical Care Medicine  [facility]          [1] No Known Allergies  [2]   No current outpatient medications on file prior to visit.     No current facility-administered medications on file prior to visit.

## 2025-01-22 ENCOUNTER — OFFICE VISIT (OUTPATIENT)
Dept: PULMONOLOGY | Facility: CLINIC | Age: 30
End: 2025-01-22

## 2025-01-22 VITALS
BODY MASS INDEX: 32.28 KG/M2 | RESPIRATION RATE: 14 BRPM | WEIGHT: 213 LBS | HEART RATE: 67 BPM | SYSTOLIC BLOOD PRESSURE: 114 MMHG | OXYGEN SATURATION: 98 % | DIASTOLIC BLOOD PRESSURE: 66 MMHG | HEIGHT: 68 IN

## 2025-01-22 DIAGNOSIS — R06.83 SNORING: ICD-10-CM

## 2025-01-22 DIAGNOSIS — J98.51 FIBROSING MEDIASTINITIS: Primary | ICD-10-CM

## 2025-01-22 PROCEDURE — 3074F SYST BP LT 130 MM HG: CPT | Performed by: INTERNAL MEDICINE

## 2025-01-22 PROCEDURE — 99204 OFFICE O/P NEW MOD 45 MIN: CPT | Performed by: INTERNAL MEDICINE

## 2025-01-22 PROCEDURE — 3008F BODY MASS INDEX DOCD: CPT | Performed by: INTERNAL MEDICINE

## 2025-01-22 PROCEDURE — 3078F DIAST BP <80 MM HG: CPT | Performed by: INTERNAL MEDICINE

## 2025-01-22 NOTE — PATIENT INSTRUCTIONS
Schedule a sleep study.  Contact the number on the After Visit Summary (AVS):  -Strongly advise absolutely NO driving when sleepy  -Recommend good sleep hygiene as discussed   -Avoid alcohol and caffeine before going to bed      We will have you sign a medical release so we can obtain previous outside records.    Based on the records, we can order further testing including breathing test, chest imaging, etc.    Recommend stop smoking completely.     Come back to see us in 1-2 months.

## 2025-01-22 NOTE — PROGRESS NOTES
ISIDRO sent to [facility] and [facility] medical [facility] for CT disc. Confirmation received and sent to scanning

## 2025-01-23 ENCOUNTER — TELEPHONE (OUTPATIENT)
Dept: PULMONOLOGY | Facility: CLINIC | Age: 30
End: 2025-01-23

## 2025-01-23 NOTE — TELEPHONE ENCOUNTER
Fax received of patient's medical records from [facility].    Dr Taylor- records placed in your folder

## 2025-01-27 NOTE — TELEPHONE ENCOUNTER
Reports were available at time of appointment and reviewed by Dr. Taylor. Records sent for scanning. Awaiting imaging on CD.

## 2025-01-30 NOTE — TELEPHONE ENCOUNTER
Received encrypted CD with 10/11/18 IR superior maria isabel cava venogram and 10/10/18 CT abdomen pelvis venogram with IV contrast.     CD placed in Dr. Taylor's folder- awaiting encryption code (per instructions included with CD, a letter containing password will arrive in separate mailing within 5-7 days).

## 2025-01-31 NOTE — TELEPHONE ENCOUNTER
Medical records received from [facility] containing CT reports from 2018 and 2019.     Dr Taylor- records placed in your folder for review

## 2025-02-12 NOTE — TELEPHONE ENCOUNTER
Written reports reviewed by Dr. Taylor and sent for scanning. CD containing images delivered to radiology film library for import to PACS (encryption password attached).

## 2025-06-19 ENCOUNTER — HOSPITAL ENCOUNTER (EMERGENCY)
Facility: HOSPITAL | Age: 30
Discharge: HOME | End: 2025-06-19
Attending: EMERGENCY MEDICINE
Payer: COMMERCIAL

## 2025-06-19 ENCOUNTER — APPOINTMENT (OUTPATIENT)
Dept: ULTRASOUND IMAGING | Facility: HOSPITAL | Age: 30
End: 2025-06-19
Attending: EMERGENCY MEDICINE
Payer: COMMERCIAL

## 2025-06-19 ENCOUNTER — TELEPHONE (OUTPATIENT)
Dept: INTERNAL MEDICINE CLINIC | Facility: CLINIC | Age: 30
End: 2025-06-19

## 2025-06-19 VITALS
DIASTOLIC BLOOD PRESSURE: 84 MMHG | SYSTOLIC BLOOD PRESSURE: 127 MMHG | TEMPERATURE: 98 F | HEART RATE: 78 BPM | RESPIRATION RATE: 18 BRPM | WEIGHT: 219 LBS | BODY MASS INDEX: 33 KG/M2 | OXYGEN SATURATION: 97 %

## 2025-06-19 DIAGNOSIS — I82.B11 SUBCLAVIAN VEIN THROMBOSIS, RIGHT (HCC): ICD-10-CM

## 2025-06-19 DIAGNOSIS — I82.890 JUGULAR VEIN THROMBOSIS, RIGHT: Primary | ICD-10-CM

## 2025-06-19 LAB
ANION GAP SERPL CALC-SCNC: 6 MMOL/L (ref 0–18)
APTT PPP: 26 SECONDS (ref 23–36)
BASOPHILS # BLD AUTO: 0.07 X10(3) UL (ref 0–0.2)
BASOPHILS NFR BLD AUTO: 1.2 %
BUN BLD-MCNC: 19 MG/DL (ref 9–23)
BUN/CREAT SERPL: 16.4 (ref 10–20)
CALCIUM BLD-MCNC: 9.2 MG/DL (ref 8.7–10.4)
CHLORIDE SERPL-SCNC: 109 MMOL/L (ref 98–112)
CO2 SERPL-SCNC: 26 MMOL/L (ref 21–32)
CREAT BLD-MCNC: 1.16 MG/DL (ref 0.7–1.3)
DEPRECATED RDW RBC AUTO: 41.5 FL (ref 35.1–46.3)
EGFRCR SERPLBLD CKD-EPI 2021: 89 ML/MIN/1.73M2 (ref 60–?)
EOSINOPHIL # BLD AUTO: 0.18 X10(3) UL (ref 0–0.7)
EOSINOPHIL NFR BLD AUTO: 3.1 %
ERYTHROCYTE [DISTWIDTH] IN BLOOD BY AUTOMATED COUNT: 11.9 % (ref 11–15)
GLUCOSE BLD-MCNC: 93 MG/DL (ref 70–99)
HCT VFR BLD AUTO: 38.3 % (ref 39–53)
HGB BLD-MCNC: 13.5 G/DL (ref 13–17.5)
IMM GRANULOCYTES # BLD AUTO: 0.01 X10(3) UL (ref 0–1)
IMM GRANULOCYTES NFR BLD: 0.2 %
INR BLD: 0.91 (ref 0.8–1.2)
LYMPHOCYTES # BLD AUTO: 2.26 X10(3) UL (ref 1–4)
LYMPHOCYTES NFR BLD AUTO: 38.3 %
MCH RBC QN AUTO: 33.5 PG (ref 26–34)
MCHC RBC AUTO-ENTMCNC: 35.2 G/DL (ref 31–37)
MCV RBC AUTO: 95 FL (ref 80–100)
MONOCYTES # BLD AUTO: 0.62 X10(3) UL (ref 0.1–1)
MONOCYTES NFR BLD AUTO: 10.5 %
NEUTROPHILS # BLD AUTO: 2.76 X10 (3) UL (ref 1.5–7.7)
NEUTROPHILS # BLD AUTO: 2.76 X10(3) UL (ref 1.5–7.7)
NEUTROPHILS NFR BLD AUTO: 46.7 %
OSMOLALITY SERPL CALC.SUM OF ELEC: 294 MOSM/KG (ref 275–295)
PLATELET # BLD AUTO: 230 10(3)UL (ref 150–450)
POTASSIUM SERPL-SCNC: 4.1 MMOL/L (ref 3.5–5.1)
PROTHROMBIN TIME: 12.8 SECONDS (ref 11.6–14.8)
RBC # BLD AUTO: 4.03 X10(6)UL (ref 4.3–5.7)
SODIUM SERPL-SCNC: 141 MMOL/L (ref 136–145)
WBC # BLD AUTO: 5.9 X10(3) UL (ref 4–11)

## 2025-06-19 PROCEDURE — 36415 COLL VENOUS BLD VENIPUNCTURE: CPT

## 2025-06-19 PROCEDURE — 96372 THER/PROPH/DIAG INJ SC/IM: CPT

## 2025-06-19 PROCEDURE — 85025 COMPLETE CBC W/AUTO DIFF WBC: CPT | Performed by: EMERGENCY MEDICINE

## 2025-06-19 PROCEDURE — 85610 PROTHROMBIN TIME: CPT | Performed by: EMERGENCY MEDICINE

## 2025-06-19 PROCEDURE — 85730 THROMBOPLASTIN TIME PARTIAL: CPT | Performed by: EMERGENCY MEDICINE

## 2025-06-19 PROCEDURE — 99285 EMERGENCY DEPT VISIT HI MDM: CPT

## 2025-06-19 PROCEDURE — 99284 EMERGENCY DEPT VISIT MOD MDM: CPT

## 2025-06-19 PROCEDURE — 80048 BASIC METABOLIC PNL TOTAL CA: CPT | Performed by: EMERGENCY MEDICINE

## 2025-06-19 PROCEDURE — 93971 EXTREMITY STUDY: CPT | Performed by: EMERGENCY MEDICINE

## 2025-06-19 RX ORDER — ENOXAPARIN SODIUM 100 MG/ML
100 INJECTION SUBCUTANEOUS ONCE
Status: COMPLETED | OUTPATIENT
Start: 2025-06-19 | End: 2025-06-19

## 2025-06-19 NOTE — ED INITIAL ASSESSMENT (HPI)
Patient reports that his sister is an US tech and she did a non-scheduled or indicated US on him, noting that she found a blood clot in his Right Jugular. Denies dizziness or pain

## 2025-06-19 NOTE — TELEPHONE ENCOUNTER
JODIE Boucher...    Patient calling (verified full name and date of birth).  Asking for appointment  Stated he went to his sisters ultra sound facility today to be used as a trial patient   Upon her doing an ultra sound of patient right carotid artery, she informed him that he appears to have a possible blood clot.  Patient stated he does have a fibrosis condition in which he has a swollen lymph node and a defect with his superior vena cava which does predispose him to blood clots.    Denied any dizziness, headache or other symptoms    Advised to go to ED now for evaluation and will go to [facility] ED

## 2025-06-19 NOTE — DISCHARGE INSTRUCTIONS
Start taking Eliquis as soon as possible.  Given a dose of Lovenox today in the ER.  Follow-up with your primary care provider 1 to 2 days.  Follow-up with vascular surgery, given referral call to schedule appointment.  Return to the ER if some continue, get worse, unable to follow-up

## 2025-06-19 NOTE — ED PROVIDER NOTES
Patient Seen in: [facility] Emergency Department    History     Chief Complaint   Patient presents with    Other       HPI    29-year-old male with a history of fibrosing mediastinitis who came in today because his sister did a curbside ultrasound on his right neck and told him he has a thrombus in his right jugular vein.  Patient has no complaints.  Patient states he is post to be taking aspirin daily but has not been doing it.  Has been on blood thinners in the past but was taken off of them    History reviewed. Past Medical History[1]    History reviewed. Past Surgical History[2]      Medications :  Prescriptions Prior to Admission[3]     Family History[4]    Smoking Status: Social Hx on file[5]    Constitutional and vital signs reviewed.      Social History and Family History elements reviewed from today, pertinent positives to the presenting problem noted.    Physical Exam     ED Triage Vitals [06/19/25 1430]   /75   Pulse 72   Resp 18   Temp 98.3 °F (36.8 °C)   Temp src Temporal   SpO2 97 %   O2 Device None (Room air)       All measures to prevent infection transmission during my interaction with the patient were taken. Handwashing was performed prior to and after the exam.  Stethoscope and any equipment used during my examination was cleaned with super sani-cloth germicidal wipes following the exam.     Physical Exam  Vitals and nursing note reviewed.   Cardiovascular:      Rate and Rhythm: Normal rate.   Abdominal:      Palpations: Abdomen is soft.   Skin:     General: Skin is warm and dry.   Neurological:      Mental Status: He is alert and oriented to person, place, and time.         ED Course        Labs Reviewed   CBC WITH DIFFERENTIAL WITH PLATELET - Abnormal; Notable for the following components:       Result Value    RBC 4.03 (*)     HCT 38.3 (*)     All other components within normal limits   BASIC METABOLIC PANEL (8) - Normal   PROTHROMBIN TIME (PT) - Normal   PTT, ACTIVATED - Normal        As Interpreted by me    Imaging Results Available and Reviewed while in ED: US VENOUS DOPPLER ARM RIGHT - DIAG IMG (CPT=93971)  Result Date: 6/19/2025  CONCLUSION: No acute/occlusive DVT in the right upper extremity.  There is chronic nonocclusive thrombus within the right internal jugular vein and right subclavian vein.   Dictated by (CST): Enrike Jefferson MD on 6/19/2025 at 4:59 PM     Finalized by (CST): Enrike Jefferson MD on 6/19/2025 at 5:01 PM          ED Medications Administered:   Medications   enoxaparin (Lovenox) 100 MG/ML SUBQ injection 100 mg (has no administration in time range)         MDM     Vitals:    06/19/25 1430 06/19/25 1723   BP: 128/75 127/84   Pulse: 72 66   Resp: 18 18   Temp: 98.3 °F (36.8 °C)    TempSrc: Temporal    SpO2: 97% 97%   Weight: 99.3 kg      *I personally reviewed and interpreted all ED vitals.    Pulse Ox: 97%, Room air, Normal       Differential Diagnosis/ Diagnostic Considerations: DVT, thrombus, hypercoagulable state    Complicating Factors: The patient already has does not have a problem list on file. to contribute to the complexity of this ED evaluation.    Medical Decision Making  Amount and/or Complexity of Data Reviewed  External Data Reviewed: notes.     Details: I reviewed notes from patient's primary care provider  to help contribute to patient's medical history  Labs: ordered. Decision-making details documented in ED Course.  Radiology: ordered.    Risk  OTC drugs.  Prescription drug management.    I reviewed labs with the patient there is nothing acute.  Ultrasound shows she has got a chronic nonocclusive thrombus of the right jugular and subclavian veins.  Explained patient will discuss with vascular surgery.    I discussed case with -vascular surgery recommend starting the patient anticoagulation and will need to be on it for at least 3 months and follow-up in his clinic.    Given patient dose of Lovenox here in the ER and a prescription for the  Eliquis starter pack.  I explained the patient to follow-up with his primary care provider 1 to 2 days.  Given vascular surgery referral call to schedule appoint.  Return to the ER if some continue, get worse, unable to follow-up    Condition upon leaving the department: Stable    Disposition and Plan     Clinical Impression:  1. Jugular vein thrombosis, right    2. Subclavian vein thrombosis, right (HCC)        Disposition:  Discharge    Follow-up:  Juan Luis Lazar MD  [address]  [address]  [place] [address]  [phone]    Follow up      Everardo Velarde MD  [address] [address]  [phone]    Follow up        Medications Prescribed:  Current Discharge Medication List        START taking these medications    Details   apixaban 5 MG Oral Tab Take 2 tabs (10mg) by mouth twice daily for 7 days, then take 1 tab (5mg) by mouth twice daily thereafter.  Qty: 74 tablet, Refills: 0                              [1]   Past Medical History:   Fibrosing mediastinitis   [2] History reviewed. No pertinent surgical history.  [3] (Not in a hospital admission)   [4] No family history on file.  [5]   Social History  Socioeconomic History    Marital status:    Tobacco Use    Smoking status: Never     Passive exposure: Current    Smokeless tobacco: Former   Vaping Use    Vaping status: Former   Substance and Sexual Activity    Alcohol use: Not Currently    Drug use: Yes     Types: Cannabis

## 2025-06-30 ENCOUNTER — OFFICE VISIT (OUTPATIENT)
Facility: CLINIC | Age: 30
End: 2025-06-30
Payer: COMMERCIAL

## 2025-06-30 VITALS
BODY MASS INDEX: 33.95 KG/M2 | HEIGHT: 68 IN | DIASTOLIC BLOOD PRESSURE: 70 MMHG | SYSTOLIC BLOOD PRESSURE: 110 MMHG | WEIGHT: 224 LBS

## 2025-06-30 DIAGNOSIS — I87.1 SVC SYNDROME: Primary | ICD-10-CM

## 2025-07-03 NOTE — PROGRESS NOTES
[facility]    Vascular Surgery Consultation    Kamron Vázquez Patient Status:  Specimen    1995 MRN [id number]     Attending No att. providers found     PCP vEerardo Velarde MD     Date of Consult: 2025    “I was requested by Dr. Velarde to provide a consultation for Kamron Vázquez  Reason for Consultation:   Concerns for right subclavian and internal jugular vein DVT    History of Present Illness:   Patient is a 29 year old male with PMHx of left arm DVT who attended to the clinic today with concern of right subclavian and internal jugular vein DVT.  Patient was totally asymptomatic, however his sister tried to practice using ultrasound on him and scanned his right jugular vein when she noticed there was a thrombus inside the vein.  He ultimately presented to the emergency room and underwent an official venous duplex ultrasound of the right internal jugular and subclavian veins which was notable for nonocclusive internal jugular and subclavian veins DVT.  He was a started on therapeutic anticoagulation and referred to vascular surgery for further evaluation and recommendations.  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  Recanalization of the superior vena cava was attempted at [facility] which was not successful.  He was told no further intervention is indicated and he was never kept on anticoagulation.  Today, he denies having any significant pain, shortness of breath, upper extremity edema, swelling or pain.  He denies having any facial plethora or congestion.  He denies having any problem with swallowing, having chronic cough or change of his voice.    Past Medical History  Past Medical  History[1]    Past Surgical History  Past Surgical History[2]    Family History  Family History[3]    Social History  Short Social Hx on File[4]     Current Medications:  Current Hospital Medications[5]  Prescriptions Prior to Admission[6]    Allergies  Allergies[7]    Review of Systems:   Constitutional: No fevers, chills, fatigue or night sweats.  ENT: No neck pain, running nose, headaches.  Skin: No rashes, pruritus or skin changes,  Respiratory: No coughing, phlegm or wheezing.  CV: Denies chest pain, palpitations, orthopnea, or dizziness.  Musculoskeletal: No joint pain, stiffness or swelling.  GI: No nausea, vomiting.  No diarrhea.  Denies any abdominal pain or unintentional weight loss.  No food fear.  No postprandial abdominal pain.   Neurologic: No seizures, tremors, weakness or numbness.    Physical Exam:   Blood pressure 110/70, height 5' 8\" (1.727 m), weight 224 lb (101.6 kg).      General: NAD  Neck: No JVD  Lungs: CTA bilat  Heart: RRR  Abdomen: Soft, no tenderness or sign of peritonitis.  No rigidity or self guarding.    Extremities: Warm, dry, no LE edema bilat  Vascular: He has palpable bilateral radial and ulnar pulses.  He has visible and bulged superficial neck and anterior chest veins.  Skin: no rashes or legions noted  Neurological:  AAOx3, MAEW      Results:     Laboratory Data:  Lab Results   Component Value Date    WBC 5.9 06/19/2025    HGB 13.5 06/19/2025    HCT 38.3 (L) 06/19/2025    .0 06/19/2025    CREATSERUM 1.16 06/19/2025    BUN 19 06/19/2025     06/19/2025    K 4.1 06/19/2025     06/19/2025    CO2 26.0 06/19/2025    GLU 93 06/19/2025    CA 9.2 06/19/2025    ALB 4.5 10/21/2024    ALKPHO 62 10/21/2024    TP 7.1 10/21/2024    AST 24 10/21/2024    ALT 16 10/21/2024    PTT 26.0 06/19/2025    INR 0.91 06/19/2025    PTP 12.8 06/19/2025    TSH 0.665 10/21/2024         Imaging:  US VENOUS DOPPLER ARM RIGHT - DIAG IMG (CPT=93971)  Narrative: PROCEDURE: US VENOUS DOPPLER ARM  RIGHT-DIAG IMG (CPT=93971)     COMPARISON: None.     INDICATIONS: RT IVJ thrombus     TECHNIQUE: Color duplex Doppler venous ultrasound of the right upper extremity was performed in the usual manner.     FINDINGS: Chronic appearing thrombus within the proximal aspect of the right internal jugular vein and proximal-distal segments of the subclavian vein.  Thrombus is nonocclusive with abnormal grayscale appearance and abnormal color flow in the lumen with   detectable Doppler waveforms and portions of the vein show color flow.  The peripheral/distal aspect of the right cephalic vein is not well visualized in its proximal components are patent.  The axillary, brachial, cephalic and basilic veins appear   normal.  Flow was demonstrated with color and pulsed Doppler.  Comparison scans of the contralateral subclavian vein appear normal.                   Impression: CONCLUSION: No acute/occlusive DVT in the right upper extremity.  There is chronic nonocclusive thrombus within the right internal jugular vein and right subclavian vein.        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  This was previously identified during the workup in November 2018 when a recannulization of the SVC was attempted without any success.  He was told no further intervention is indicated and he was not placed on anticoagulation.    Recommendations:  -I had a long conversation with the patient explaining about all of the above findings.  I told the patient that most likely the right internal jugular and subclavian vein thrombus is chronic.  I also explained about the stenosis of the SVC which does not need any intervention as he is  not symptomatic from it.  - Although patient does not have any associated symptoms with the above clinical findings, I suggested to obtain a CT venogram of the chest to set up an anatomical baseline for him for future references.  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    Thank you for allowing me to participate in the care of your patient.    Juan Luis Lazar MD  6/30/2025  [facility], Division of Vascular Surgery    Please note: Dragon speech recognition software was used to prepare this note. If a word or phrase is confusing, it is likely do to a failure of recognition.   Please contact me with any questions or clarifications.       [1]   Past Medical History:   Fibrosing mediastinitis   [2] History reviewed. No pertinent surgical history.  [3] History reviewed. No pertinent family history.  [4]   Social History  Socioeconomic History    Marital status:    Tobacco Use    Smoking status: Never     Passive exposure: Current    Smokeless tobacco: Former   Vaping Use    Vaping status: Former   Substance and Sexual Activity    Alcohol use: Not Currently    Drug use: Yes     Types: Cannabis     Comment: daily     Social Drivers of Health      [place] [facility] [facility] [place]    Stress   [5]   No current facility-administered medications for this visit.   [6] (Not in a hospital admission)  [7] No Known Allergies

## 2025-07-07 ENCOUNTER — OFFICE VISIT (OUTPATIENT)
Dept: INTERNAL MEDICINE CLINIC | Facility: CLINIC | Age: 30
End: 2025-07-07

## 2025-07-07 VITALS
DIASTOLIC BLOOD PRESSURE: 65 MMHG | OXYGEN SATURATION: 98 % | RESPIRATION RATE: 18 BRPM | HEIGHT: 68 IN | TEMPERATURE: 98 F | BODY MASS INDEX: 35.46 KG/M2 | HEART RATE: 67 BPM | SYSTOLIC BLOOD PRESSURE: 112 MMHG | WEIGHT: 234 LBS

## 2025-07-07 DIAGNOSIS — Z09 ENCOUNTER FOR EXAMINATION FOLLOWING TREATMENT AT HOSPITAL: Primary | ICD-10-CM

## 2025-07-07 DIAGNOSIS — T07.XXXA INFECTED INSECT BITES OF MULTIPLE SITES: ICD-10-CM
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
## 2025-07-07 DIAGNOSIS — I82.B11 SUBCLAVIAN VEIN THROMBOSIS, RIGHT (HCC): ICD-10-CM

## 2025-07-07 DIAGNOSIS — L08.9 INFECTED INSECT BITES OF MULTIPLE SITES: ICD-10-CM

## 2025-07-07 DIAGNOSIS — I82.890 JUGULAR VEIN THROMBOSIS, RIGHT: ICD-10-CM

## 2025-07-07 DIAGNOSIS — I87.1 SVC SYNDROME: ICD-10-CM

## 2025-07-07 PROCEDURE — 3074F SYST BP LT 130 MM HG: CPT | Performed by: INTERNAL MEDICINE

## 2025-07-07 PROCEDURE — 3078F DIAST BP <80 MM HG: CPT | Performed by: INTERNAL MEDICINE

## 2025-07-07 PROCEDURE — 99214 OFFICE O/P EST MOD 30 MIN: CPT | Performed by: INTERNAL MEDICINE

## 2025-07-07 PROCEDURE — 3008F BODY MASS INDEX DOCD: CPT | Performed by: INTERNAL MEDICINE

## 2025-07-07 RX ORDER — SULFAMETHOXAZOLE AND TRIMETHOPRIM 800; 160 MG/1; MG/1
1 TABLET ORAL 2 TIMES DAILY
Qty: 14 TABLET | Refills: 0 | Status: SHIPPED | OUTPATIENT
Start: 2025-07-07 | End: 2025-07-14

## 2025-07-07 NOTE — PROGRESS NOTES
Subjective:     Patient ID: Kamron Vázquez is a 29 year old male.    Patient presents today for follow-up after he was seen in ER last month with concern about the right internal jugular and subclavian vein clot noted by his sister who was practicing ultrasound sent to ER ER repeated ultrasound to which found to chronic clot patient saw vascular surgeon notes and recommendation noted ordered CT venogram told to continue with the blood thinner right now given ER pending results we will decide to refer him to hematologist and decide on long-term treatment.  Patient denies any symptoms no neck pain no chest pain.  He is complaining today of having a lot of mosquito bites and some are painful from over the weekend trip to a park         History/Other:   Review of Systems   Constitutional: Negative.    HENT: Negative.     Eyes: Negative.    Respiratory: Negative.     Cardiovascular: Negative.    Gastrointestinal: Negative.    Genitourinary: Negative.    Musculoskeletal: Negative.    Skin:  Positive for rash.        Mosquito bites , few infected    Neurological: Negative.    Psychiatric/Behavioral: Negative.       Current Medications[1]  Allergies:Allergies[2]    Past Medical History[3]   Past Surgical History[4]   Family History[5]   Social History: Short Social Hx on File[6]     Objective:   Physical Exam  Vitals and nursing note reviewed.   Constitutional:       Appearance: He is well-developed.   HENT:      Head: Normocephalic and atraumatic.      Right Ear: External ear normal.      Left Ear: External ear normal.      Nose: Nose normal.   Eyes:      Conjunctiva/sclera: Conjunctivae normal.      Pupils: Pupils are equal, round, and reactive to light.   Cardiovascular:      Rate and Rhythm: Normal rate and regular rhythm.      Heart sounds: Normal heart sounds.   Pulmonary:      Effort: Pulmonary effort is normal.      Breath sounds: Normal breath sounds.   Abdominal:      General: Bowel sounds are normal.       Palpations: Abdomen is soft.   Genitourinary:     Penis: Normal.       Prostate: Normal.      Rectum: Normal.   Musculoskeletal:         General: Normal range of motion.      Cervical back: Normal range of motion and neck supple.   Skin:     General: Skin is warm and dry.      Comments: Mosquito bites , few infected    Neurological:      Mental Status: He is alert and oriented to person, place, and time.      Deep Tendon Reflexes: Reflexes are normal and symmetric.         Assessment & Plan:   1. Encounter for examination following treatment at hospital - over all doing ok    2. Jugular vein thrombosis, right - chronic seen VS notes and recommendations noted will follow ct scan and further recommendations , continue with medical treatment for now    3. Subclavian vein thrombosis, right (HCC) as above    4. SVC syndrome - chronic    5. Infected insect bites of multiple sites - will treat with ab let us know if not better        No orders of the defined types were placed in this encounter.      Meds This Visit:  Requested Prescriptions     Signed Prescriptions Disp Refills    sulfamethoxazole-trimethoprim -160 MG Oral Tab per tablet 14 tablet 0     Sig: Take 1 tablet by mouth 2 (two) times daily for 7 days.       Imaging & Referrals:  None            [1]   Current Outpatient Medications   Medication Sig Dispense Refill    sulfamethoxazole-trimethoprim -160 MG Oral Tab per tablet Take 1 tablet by mouth 2 (two) times daily for 7 days. 14 tablet 0    apixaban 5 MG Oral Tab Take 2 tabs (10mg) by mouth twice daily for 7 days, then take 1 tab (5mg) by mouth twice daily thereafter. 74 tablet 0   [2] No Known Allergies  [3]   Past Medical History:   Fibrosing mediastinitis   [4] No past surgical history on file.  [5] No family history on file.  [6]   Social History  Socioeconomic History    Marital status:    Tobacco Use    Smoking status: Never     Passive exposure: Current    Smokeless tobacco: Former    Vaping Use    Vaping status: Former   Substance and Sexual Activity    Alcohol use: Not Currently    Drug use: Yes     Types: Cannabis     Comment: daily     Social Drivers of Health      Received from [facility]

## 2025-07-15 ENCOUNTER — HOSPITAL ENCOUNTER (OUTPATIENT)
Dept: CT IMAGING | Facility: HOSPITAL | Age: 30
Discharge: HOME | End: 2025-07-15
Attending: STUDENT IN AN ORGANIZED HEALTH CARE EDUCATION/TRAINING PROGRAM
Payer: COMMERCIAL

## 2025-07-15 DIAGNOSIS — I87.1 SVC SYNDROME: ICD-10-CM

## 2025-07-15 PROCEDURE — 71260 CT THORAX DX C+: CPT | Performed by: STUDENT IN AN ORGANIZED HEALTH CARE EDUCATION/TRAINING PROGRAM

## 2025-07-17 DIAGNOSIS — I82.890 JUGULAR VEIN THROMBOSIS, RIGHT: ICD-10-CM

## 2025-07-17 DIAGNOSIS — I87.1 SVC SYNDROME: Primary | ICD-10-CM

## 2025-08-29 ENCOUNTER — APPOINTMENT (OUTPATIENT)
Dept: GENERAL RADIOLOGY | Age: 30
End: 2025-08-29
Attending: NURSE PRACTITIONER

## 2025-08-29 ENCOUNTER — HOSPITAL ENCOUNTER (OUTPATIENT)
Age: 30
Discharge: HOME | End: 2025-08-29

## 2025-08-29 VITALS
OXYGEN SATURATION: 100 % | DIASTOLIC BLOOD PRESSURE: 74 MMHG | RESPIRATION RATE: 20 BRPM | SYSTOLIC BLOOD PRESSURE: 112 MMHG | TEMPERATURE: 98 F | HEART RATE: 72 BPM

## 2025-08-29 DIAGNOSIS — M79.671 RIGHT FOOT PAIN: ICD-10-CM

## 2025-08-29 DIAGNOSIS — M77.41 METATARSALGIA OF RIGHT FOOT: Primary | ICD-10-CM

## 2025-08-29 PROCEDURE — 99213 OFFICE O/P EST LOW 20 MIN: CPT | Performed by: NURSE PRACTITIONER

## 2025-08-29 PROCEDURE — 73630 X-RAY EXAM OF FOOT: CPT | Performed by: NURSE PRACTITIONER

## (undated) NOTE — LETTER
Date & Time: 10/5/2024, 9:51 AM  Patient: Kamron Vázquez  Encounter Provider(s):    Karo Dent PA       To Whom It May Concern:    Kamron Vázquez was seen and treated in our department on 10/5/2024. Please excuse him from work on 10/5/24.     If you have any questions or concerns, please do not hesitate to call.        _____________________________  Physician/APC Signature

## (undated) NOTE — LETTER
Date & Time: 6/15/2023, 9:47 AM  Patient: Morrell Lesch  Encounter Provider(s):    LATISHA Springer       To Whom It May Concern:    Lara Hernandez was seen and treated in our department on 6/15/2023. He can return to work June 20.2023. If you have any questions or concerns, please do not hesitate to call.       Matthew Light, DINORA  _____________________________  [id number]/JRENETTA Signature

## (undated) NOTE — LETTER
Date & Time: 4/19/2024, 11:00 AM  Patient: Kamron Vázquez  Encounter Provider(s):    Radha Suarez APRN       To Whom It May Concern:    Kamron Vázquez was seen and treated in our department on 4/19/2024. He  should be excused from work today and tomorrow through 4/20/2024 .    If you have any questions or concerns, please do not hesitate to call.        _____________________________  Physician/APC Signature

## (undated) NOTE — LETTER
10/21/2024              Kamron Vázquez        [address] [facility] [address]         To Whom it may concern:    This is to certify that Kamron Vázquez had an appointment on 10/21/2024 at 11:45 AM with Everardo Velarde MD.        Sincerely,    Everardo Velarde MD  [facility], [place], [place]  [address] [place] [id number]  [phone]

## (undated) NOTE — LETTER
Date & Time: 6/19/2025, 6:26 PM  Patient: Kamron Vázquez  Encounter Provider(s):    Chris Jones DO       To Whom It May Concern:    Kamron Vázquez was seen and treated in our department on 6/19/2025. He should not return to work until 6/23/25.    If you have any questions or concerns, please do not hesitate to call.        _____________________________  Physician/APC Signature

## (undated) NOTE — Clinical Note
10/21/2024          To Whom It May Concern:    Kamron Vázquez is currently under my medical care and may not return to {em school/work:368961217} at this time.    Please excuse Kamron for {NUMBERS 0-10:3282} {days weeks:3323}.  {HE/SHE :6250} may return to {em school/work:849462965} on ***.  Activity is restricted as follows: {EM SCHOOL/WORK RESTRICTIONS:361259831}.    If you require additional information please contact our office.        Sincerely,    Everardo Velarde MD          Document generated by:  Priscilla JANE CMA

## (undated) NOTE — LETTER
10/7/2024          To Whom It May Concern:    Kamron Vázquez is currently under my medical care and may not return to work at this time.    Please excuse Kamron for 1 weeks.  He may return to work on 10/14/24.  Activity is restricted as follows: none.    If you require additional information please contact our office.        Sincerely,    Everardo Velrade MD

## (undated) NOTE — LETTER
Date & Time: 11/28/2023, 8:44 AM  Patient: Yvette Taylor  Encounter Provider(s):    LATISHA Foley       To Whom It May Concern:    Joann Slater was seen and treated in our department on 11/28/2023. He can return to work Nov 29, 2023. If you have any questions or concerns, please do not hesitate to call.       Anish Abdi NP  _____________________________  [id number]/QGP Signature

## (undated) NOTE — LETTER
Date & Time: 10/5/2024, 9:51 AM  Patient: Kamron Vázquez  Encounter Provider(s):    Karo Dent PA         This certifies that I, Kamron Vázquez, a patient at an [facility], am leaving the facility voluntarily and against the advice of my physician.    I acknowledge that I have been:    1. informed that my physician believes that I need to receive care here;  2. informed that if I leave, I could become sicker or even die; and  3. provided discharge instructions consistent with my current diagnosis.    I hereby release my physician, the facility, and its employees from all responsibility for any ill effects which may result from this action.        __________________________________  Patient or authorized caregiver signature    __________________________________  RN signature    If no patient or patient representative signature was obtained, sign below to acknowledge that the form was reviewed with the patient and that the patient refused to sign.    __________________________________  RN signature

## (undated) NOTE — LETTER
To Whom It May Concern:      This certifies that Kamron Vázquez was seen in my office today. Do not hesitate to call with any questions or concerns.            Sincerely,        Agustin Taylor, DO  [facility], [facility], [place]  [address] [facility] [address]  [facility] [id number] [phone]